# Patient Record
Sex: FEMALE | Race: BLACK OR AFRICAN AMERICAN | NOT HISPANIC OR LATINO | URBAN - METROPOLITAN AREA
[De-identification: names, ages, dates, MRNs, and addresses within clinical notes are randomized per-mention and may not be internally consistent; named-entity substitution may affect disease eponyms.]

---

## 2018-11-11 ENCOUNTER — EMERGENCY (EMERGENCY)
Facility: HOSPITAL | Age: 58
LOS: 1 days | Discharge: HOME | End: 2018-11-11
Attending: EMERGENCY MEDICINE | Admitting: EMERGENCY MEDICINE
Payer: COMMERCIAL

## 2018-11-11 VITALS
TEMPERATURE: 99 F | OXYGEN SATURATION: 100 % | DIASTOLIC BLOOD PRESSURE: 70 MMHG | RESPIRATION RATE: 16 BRPM | SYSTOLIC BLOOD PRESSURE: 144 MMHG | HEART RATE: 72 BPM

## 2018-11-11 LAB
ALBUMIN SERPL ELPH-MCNC: 4.3 G/DL — SIGNIFICANT CHANGE UP (ref 3.3–5)
ALP SERPL-CCNC: 139 U/L — HIGH (ref 40–120)
ALT FLD-CCNC: 7 U/L — SIGNIFICANT CHANGE UP (ref 4–33)
APTT BLD: 30.9 SEC — SIGNIFICANT CHANGE UP (ref 27.5–36.3)
AST SERPL-CCNC: 11 U/L — SIGNIFICANT CHANGE UP (ref 4–32)
B-OH-BUTYR SERPL-SCNC: 0.1 MMOL/L — SIGNIFICANT CHANGE UP (ref 0–0.4)
BASE EXCESS BLDV CALC-SCNC: 5.4 MMOL/L — SIGNIFICANT CHANGE UP
BILIRUB SERPL-MCNC: 0.4 MG/DL — SIGNIFICANT CHANGE UP (ref 0.2–1.2)
BLOOD GAS VENOUS - CREATININE: 0.71 MG/DL — SIGNIFICANT CHANGE UP (ref 0.5–1.3)
BUN SERPL-MCNC: 25 MG/DL — HIGH (ref 7–23)
CALCIUM SERPL-MCNC: 9.3 MG/DL — SIGNIFICANT CHANGE UP (ref 8.4–10.5)
CHLORIDE BLDV-SCNC: 100 MMOL/L — SIGNIFICANT CHANGE UP (ref 96–108)
CHLORIDE SERPL-SCNC: 97 MMOL/L — LOW (ref 98–107)
CO2 SERPL-SCNC: 25 MMOL/L — SIGNIFICANT CHANGE UP (ref 22–31)
CREAT SERPL-MCNC: 0.78 MG/DL — SIGNIFICANT CHANGE UP (ref 0.5–1.3)
GAS PNL BLDV: 136 MMOL/L — SIGNIFICANT CHANGE UP (ref 136–146)
GLUCOSE BLDV-MCNC: 356 — HIGH (ref 70–99)
GLUCOSE SERPL-MCNC: 372 MG/DL — HIGH (ref 70–99)
HBA1C BLD-MCNC: 10.5 % — HIGH (ref 4–5.6)
HCO3 BLDV-SCNC: 27 MMOL/L — SIGNIFICANT CHANGE UP (ref 20–27)
HCT VFR BLD CALC: 39.9 % — SIGNIFICANT CHANGE UP (ref 34.5–45)
HCT VFR BLDV CALC: 40.4 % — SIGNIFICANT CHANGE UP (ref 34.5–45)
HGB BLD-MCNC: 12.6 G/DL — SIGNIFICANT CHANGE UP (ref 11.5–15.5)
HGB BLDV-MCNC: 13.1 G/DL — SIGNIFICANT CHANGE UP (ref 11.5–15.5)
INR BLD: 0.99 — SIGNIFICANT CHANGE UP (ref 0.88–1.17)
LACTATE BLDV-MCNC: 1.7 MMOL/L — SIGNIFICANT CHANGE UP (ref 0.5–2)
MCHC RBC-ENTMCNC: 27.8 PG — SIGNIFICANT CHANGE UP (ref 27–34)
MCHC RBC-ENTMCNC: 31.6 % — LOW (ref 32–36)
MCV RBC AUTO: 88.1 FL — SIGNIFICANT CHANGE UP (ref 80–100)
NRBC # FLD: 0 — SIGNIFICANT CHANGE UP
PCO2 BLDV: 55 MMHG — HIGH (ref 41–51)
PH BLDV: 7.37 PH — SIGNIFICANT CHANGE UP (ref 7.32–7.43)
PLATELET # BLD AUTO: 404 K/UL — HIGH (ref 150–400)
PMV BLD: 10.4 FL — SIGNIFICANT CHANGE UP (ref 7–13)
PO2 BLDV: 26 MMHG — LOW (ref 35–40)
POTASSIUM BLDV-SCNC: 3.6 MMOL/L — SIGNIFICANT CHANGE UP (ref 3.4–4.5)
POTASSIUM SERPL-MCNC: 3.7 MMOL/L — SIGNIFICANT CHANGE UP (ref 3.5–5.3)
POTASSIUM SERPL-SCNC: 3.7 MMOL/L — SIGNIFICANT CHANGE UP (ref 3.5–5.3)
PROT SERPL-MCNC: 8 G/DL — SIGNIFICANT CHANGE UP (ref 6–8.3)
PROTHROM AB SERPL-ACNC: 11 SEC — SIGNIFICANT CHANGE UP (ref 9.8–13.1)
RBC # BLD: 4.53 M/UL — SIGNIFICANT CHANGE UP (ref 3.8–5.2)
RBC # FLD: 12 % — SIGNIFICANT CHANGE UP (ref 10.3–14.5)
SAO2 % BLDV: 38.7 % — LOW (ref 60–85)
SODIUM SERPL-SCNC: 135 MMOL/L — SIGNIFICANT CHANGE UP (ref 135–145)
WBC # BLD: 6.77 K/UL — SIGNIFICANT CHANGE UP (ref 3.8–10.5)
WBC # FLD AUTO: 6.77 K/UL — SIGNIFICANT CHANGE UP (ref 3.8–10.5)

## 2018-11-11 PROCEDURE — 99218: CPT

## 2018-11-11 PROCEDURE — 70450 CT HEAD/BRAIN W/O DYE: CPT | Mod: 26

## 2018-11-11 RX ORDER — SODIUM CHLORIDE 9 MG/ML
2000 INJECTION INTRAMUSCULAR; INTRAVENOUS; SUBCUTANEOUS ONCE
Qty: 0 | Refills: 0 | Status: COMPLETED | OUTPATIENT
Start: 2018-11-11 | End: 2018-11-11

## 2018-11-11 RX ORDER — METFORMIN HYDROCHLORIDE 850 MG/1
1000 TABLET ORAL ONCE
Qty: 0 | Refills: 0 | Status: COMPLETED | OUTPATIENT
Start: 2018-11-11 | End: 2018-11-11

## 2018-11-11 RX ORDER — MECLIZINE HCL 12.5 MG
25 TABLET ORAL ONCE
Qty: 0 | Refills: 0 | Status: COMPLETED | OUTPATIENT
Start: 2018-11-11 | End: 2018-11-11

## 2018-11-11 RX ADMIN — METFORMIN HYDROCHLORIDE 1000 MILLIGRAM(S): 850 TABLET ORAL at 23:39

## 2018-11-11 RX ADMIN — Medication 25 MILLIGRAM(S): at 18:48

## 2018-11-11 RX ADMIN — SODIUM CHLORIDE 1000 MILLILITER(S): 9 INJECTION INTRAMUSCULAR; INTRAVENOUS; SUBCUTANEOUS at 18:48

## 2018-11-11 NOTE — CONSULT NOTE ADULT - ASSESSMENT
Patient is a 58F with a history of HTN, DM and history of previous ischemic stroke 1 year ago without residual deficits who presents to the ED due to dizziness. Her dizziness first began about two days ago and has been persistent since then. She also reports associated nausea, vomiting, and diarrhea that began around the same time. She reports that her dizziness is made worse when she stands to walk and she feels very unsteady on her feet but has not had any falls. She is supposed to be on Plavix since her last stroke (was taken of aspirin due to GI ulcer) but is noncompliant with her medication. Denies smoking history. She denies headache, paresthesias, focal weakness, changes in vision, speech difficulties, or any other acute concerns.     On exam, patient has subtle horizontal nystagmus in both directions. She says her dizziness has improved while in the ED. Gait testing is normal.     Patient's dizziness could be related to dehydration from volume depletion, but patient has multiple vascular risk factors and history of prior stroke and is non-compliant with her anti-platelet medications and other medications (anti-hypertensives ,etc), putting her at high risk of recurrent stroke, so she would benefit from further evaluation and MR imaging.     Recommendations:  Plavix 75 mg daily (cannot take aspirin)  MRI brain without contrast  MRA head without contrast  MRA neck with contrast  Telemetry  TTE with bubble study

## 2018-11-11 NOTE — ED PROVIDER NOTE - PHYSICAL EXAMINATION
exam: no nystagmus. nl finger to nose and heel to shin. gait: slightly unsteady. unable to walk heel to toe

## 2018-11-11 NOTE — ED CDU PROVIDER INITIAL DAY NOTE - OBJECTIVE STATEMENT
Patient is a 58F with a history of HTN, DM and history of previous ischemic stroke 1 year ago without residual deficits who presents to the ED due to dizziness. Her dizziness first began about two days ago and has been persistent since then. She also reports associated nausea, vomiting, and diarrhea that began around the same time. She reports that her dizziness is made worse when she stands to walk and she feels very unsteady on her feet but has not had any falls. She is supposed to be on Plavix since her last stroke (was taken of aspirin due to GI ulcer) but is noncompliant with her medication. Denies smoking history. She denies headache, paresthesias, focal weakness, changes in vision, speech difficulties, or any other acute concerns. 58F with a history of HTN, DM and history of previous ischemic stroke 1 year ago without residual deficits who presents to the ED due to dizziness. Her dizziness first began about two days ago and has been persistent since then. She also reports associated nausea, vomiting, and diarrhea that began around the same time. She reports that her dizziness is made worse when she stands to walk and she feels very unsteady on her feet but has not had any falls. She is supposed to be on Plavix since her last stroke (was taken of aspirin due to GI ulcer) but is noncompliant with her medication. Denies smoking history. She denies headache, paresthesias, focal weakness, changes in vision, speech difficulties, or any other acute concerns. Pt admitted to the CDU for MRI

## 2018-11-11 NOTE — ED PROVIDER NOTE - OBJECTIVE STATEMENT
snehal: pmh includes dm, htn, cva (? 1 year ago).  2 days ago developed sx of vertigo, imbalance, frequent vomiting, diarrhea.

## 2018-11-11 NOTE — ED PROVIDER NOTE - PROGRESS NOTE DETAILS
snehal: seen by neurology. prelim neurology read CT negative for acute process.   MRs requested. Pts sx have decreased after meclizine.

## 2018-11-11 NOTE — ED ADULT NURSE NOTE - NSIMPLEMENTINTERV_GEN_ALL_ED
Implemented All Universal Safety Interventions:  [place] to call system. Call bell, personal items and telephone within reach. Instruct patient to call for assistance. Room bathroom lighting operational. Non-slip footwear when patient is off stretcher. Physically safe environment: no spills, clutter or unnecessary equipment. Stretcher in lowest position, wheels locked, appropriate side rails in place.

## 2018-11-11 NOTE — CONSULT NOTE ADULT - ATTENDING COMMENTS
Ms. Sanchez is a 58F with a history of HTN, DM and history of previous ischemic stroke 1 year ago without residual deficits who presents to the ED due to dizziness. Her dizziness first began about two days ago and has been persistent since then. she has been noncompliant with antiplatelet regimen of taking Plavix on a daily basis.  Her neurological exam is nonfocal. MRI brain reveals a small punctate hyperintensity in the jenniffer suggestive of possible acute/subacute small infarction likely secondary to small vessel disease and numerous white matter changes;  lacunar infarcts in bilateral cerebral hemispheres.  I recommended aggressive risk factor control; compliance with Plavix and follow up at the clinic.  Aggressive risk factor modification should be continued for secondary stroke prevention, consisting of antithrombotic therapy  intensive blood pressure control and lipid lowering therapy.I encouraged the patient to discuss these important issues with her primary care doctor.I have reviewed the goals of stroke risk factor modification. I counseled the patient on measures to reduce stroke risk, including the importance of medication compliance, risk factor control, exercise, healthy diet and avoidance of smoking. I reviewed stroke warning signs and symptoms and appropriate actions to take if such occur.

## 2018-11-11 NOTE — CONSULT NOTE ADULT - SUBJECTIVE AND OBJECTIVE BOX
HPI:    Patient is a 58F with a history of HTN, DM and history of previous ischemic stroke 1 year ago without residual deficits who presents to the ED due to dizziness. Her dizziness first began about two days ago and has been persistent since then. She also reports associated nausea, vomiting, and diarrhea that began around the same time. She reports that her dizziness is made worse when she stands to walk and she feels very unsteady on her feet but has not had any falls. She is supposed to be on Plavix since her last stroke (was taken of aspirin due to GI ulcer) but is noncompliant with her medication. Denies smoking history. She denies headache, paresthesias, focal weakness, changes in vision, speech difficulties, or any other acute concerns.       PAST MEDICAL & SURGICAL HISTORY:  Diabetes  Hypertension    FAMILY HISTORY:    Allergies    No Known Allergies    SHx - No smoking history       Review of Systems:  CONSTITUTIONAL:   HEENT:  No visual loss, blurred vision, double vision.  No hearing loss, sneezing, congestion, runny nose or sore throat.  SKIN:  No rash or itching.  CARDIOVASCULAR:  No chest pain, chest pressure or chest discomfort. No palpitations or edema.  RESPIRATORY:  No shortness of breath, cough or sputum.  GASTROINTESTINAL:  +nausea, vomiting, diarrhea  GENITOURINARY:  No dysuria. No increased frequency. No retention. No incontinence.  NEUROLOGICAL:  See HPI  MUSCULOSKELETAL:  No muscle, back pain, joint pain or stiffness.  HEMATOLOGIC:  No anemia, bleeding or bruising.  ENDOCRINOLOGIC:  No reports of sweating, cold or heat intolerance. No polyuria or polydipsia.      Vital Signs Last 24 Hrs  T(C): 37 (11 Nov 2018 17:12), Max: 37 (11 Nov 2018 17:12)  T(F): 98.6 (11 Nov 2018 17:12), Max: 98.6 (11 Nov 2018 17:12)  HR: 64 (11 Nov 2018 20:41) (64 - 75)  BP: 150/76 (11 Nov 2018 20:41) (144/70 - 150/76)  BP(mean): --  RR: 18 (11 Nov 2018 20:41) (16 - 18)  SpO2: 99% (11 Nov 2018 20:41) (99% - 100%)    General Exam:   General appearance: No acute distress   Neurological Exam:  Mental Status: Orientated to self, date and place.  Attention intact.  No dysarthria. Speech fluent.  Cranial Nerves:   PERRL, EOMI, VFF. Subtle horizontal nystagmus bidirectionally.  CN V1-3 intact to light touch .  No facial asymmetry.  Hearing intact to finger rub bilaterally.  Tongue, uvula and palate midline.  Sternocleidomastoid and Trapezius intact bilaterally.    Motor:   Tone: normal.                  Strength:     [] Upper extremity                      Delt       Bicep    Tricep                                                  R         5/5 5/5 5/5 5/5                                               L          5/5 5/5 5/5 5/5  [] Lower extremity                       HF          KE          KF        DF         PF                                               R        5/5 5/5 5/5 5/5 5/5                                               L         5/5 5/5 5/5 5/5 5/5  Pronator drift: none                 Dysmetria: None to finger-nose-finger     Tremor: No resting tremors  Gait: normal.        11-11    135  |  97<L>  |  25<H>  ----------------------------<  372<H>  3.7   |  25  |  0.78    Ca    9.3      11 Nov 2018 17:45    TPro  8.0  /  Alb  4.3  /  TBili  0.4  /  DBili  x   /  AST  11  /  ALT  7   /  AlkPhos  139<H>  11-11                            12.6   6.77  )-----------( 404      ( 11 Nov 2018 17:45 )             39.9

## 2018-11-11 NOTE — ED CDU PROVIDER INITIAL DAY NOTE - ATTENDING CONTRIBUTION TO CARE
snehal: poor balance, vertigo, vomiting, diarhea 2 days.   exam: walks with hesitation, unable to walk heel to toe.   no nystagmus.   impression: Vomiting appears to be related to vertigo which may be central (pmh) or peripheral. Sx do not appear to be due to dehydration, although pt also has diarrhea.  (pt supposed be taking plavix but is not compliant).   CT head performed. neurology consulted and MRs ordered. receiving fluids and meclizine with improved sx.    need to determine if the sx are central, peripheral, or other.   Pt also needs to have her plavix/asa medication regime reviewed, as she is not compliant.

## 2018-11-11 NOTE — ED PROVIDER NOTE - ATTENDING CONTRIBUTION TO CARE
snehal: pmh includes dm, htn, cva (? 1 year ago).  2 days ago developed sx of vertigo, imbalance, frequent vomiting, diarrhea.   exam: no nystagmus. nl finger to nose and heel to shin. gait: slightly unsteady. unable to walk heel to toe.  impression: Vomiting appears to be related to vertigo which may be central (pmh) or peripheral. Sx do not appear to be due to dehydration, although pt also has diarrhea.  (pt supposed be taking plavix but is not compliant).   CT head requested. neurology consulting. to receive fluids and meclizine.   labs pending. ; .  impression: Vomiting appears to be related to vertigo which may be central (pmh) or peripheral. Sx do not appear to be due to dehydration, although pt also has diarrhea.  (pt supposed be taking plavix but is not compliant).   CT head requested. neurology consulting. to receive fluids and meclizine.   labs pending. ;

## 2018-11-11 NOTE — ED ADULT NURSE NOTE - ED STAT RN HANDOFF DETAILS
Rpt given to CDU CAROLINE Hurst pt vitally stable, in no acute distress, coming to obs for MRI and further neuro eval

## 2018-11-11 NOTE — ED ADULT NURSE REASSESSMENT NOTE - NS ED NURSE REASSESS COMMENT FT1
Pt vitally stable, ambulating w/ steady gait, states dizziness improved, in no acute distress.  Tolerating PO at this time.  CT Rd pending, plans for CDU obs for MRI overnight.  Pt acceptable to plan of care, will CTM.

## 2018-11-11 NOTE — ED PROVIDER NOTE - MEDICAL DECISION MAKING DETAILS
impression: Vomiting appears to be related to vertigo which may be central (pmh) or peripheral. Sx do not appear to be due to dehydration, although pt also has diarrhea.  (pt supposed be taking plavix but is not compliant).   CT head requested. neurology consulting. to receive fluids and meclizine.   labs pending.

## 2018-11-11 NOTE — ED ADULT NURSE NOTE - OBJECTIVE STATEMENT
p/t is a 58y old female with hx DM received awake and responsive, c/o of diffuse abd discomfort and vomiting for few days, cardiac monitor is on vss, bloods drawn and sent to lab no further c/o noted will continue to monitor

## 2018-11-12 VITALS
SYSTOLIC BLOOD PRESSURE: 151 MMHG | TEMPERATURE: 97 F | HEART RATE: 59 BPM | OXYGEN SATURATION: 100 % | RESPIRATION RATE: 16 BRPM | DIASTOLIC BLOOD PRESSURE: 75 MMHG

## 2018-11-12 PROCEDURE — 70548 MR ANGIOGRAPHY NECK W/DYE: CPT | Mod: 26

## 2018-11-12 PROCEDURE — 99285 EMERGENCY DEPT VISIT HI MDM: CPT

## 2018-11-12 PROCEDURE — 70551 MRI BRAIN STEM W/O DYE: CPT | Mod: 26

## 2018-11-12 PROCEDURE — 99217: CPT

## 2018-11-12 RX ORDER — CLOPIDOGREL BISULFATE 75 MG/1
1 TABLET, FILM COATED ORAL
Qty: 21 | Refills: 0 | OUTPATIENT
Start: 2018-11-12 | End: 2018-12-02

## 2018-11-12 RX ORDER — ASPIRIN/CALCIUM CARB/MAGNESIUM 324 MG
1 TABLET ORAL
Qty: 21 | Refills: 0 | OUTPATIENT
Start: 2018-11-12 | End: 2018-12-02

## 2018-11-12 NOTE — ED CDU PROVIDER SUBSEQUENT DAY NOTE - MEDICAL DECISION MAKING DETAILS
history of HTN, DM and history of previous ischemic stroke 1 year ago without residual deficits who presents to the ED due to dizziness. Her dizziness first began about two days ago and has been persistent since then. She also reports associated nausea, vomiting, and diarrhea that began around the same time. She reports that her dizziness is made worse when she stands to walk and she feels very unsteady on her feet but has not had any falls. She is supposed to be on Plavix since her last stroke (was taken of aspirin due to GI ulcer) but is noncompliant with her medication. Denies smoking history. She denies headache, paresthesias, focal weakness, changes in vision, speech difficulties, or any other acute concerns.  MRI shows possible new ischemia on mri, small location. neuro clear for asa, plavix to d/c home with . stable for f/u outpt neuro.

## 2018-11-12 NOTE — ED CDU PROVIDER SUBSEQUENT DAY NOTE - ATTENDING CONTRIBUTION TO CARE
I performed a face to face bedside interview with patient regarding history of present illness, review of symptoms and past medical history. I completed an independent physical exam.  I have discussed patient's plan of care.   I agree with note as stated above, having amended the EMR as needed to reflect my findings. I have discussed the assessment and plan of care.  This includes during the time I functioned as the attending physician for this patient.  Attending Contribution to Care: agree with plan of PA. history of HTN, DM and history of previous ischemic stroke 1 year ago without residual deficits who presents to the ED due to dizziness. Her dizziness first began about two days ago and has been persistent since then. She also reports associated nausea, vomiting, and diarrhea that began around the same time. She reports that her dizziness is made worse when she stands to walk and she feels very unsteady on her feet but has not had any falls. She is supposed to be on Plavix since her last stroke (was taken of aspirin due to GI ulcer) but is noncompliant with her medication. Denies smoking history. She denies headache, paresthesias, focal weakness, changes in vision, speech difficulties, or any other acute concerns.  MRI shows possible new ischemia on mri, small location. neuro clear for asa, plavix to d/c home with . stable for f/u outpt neuro.

## 2018-11-12 NOTE — ED CDU PROVIDER SUBSEQUENT DAY NOTE - HISTORY
58 y.o female pmhx of HTN, DM and  previous ischemic stroke 1 year ago without residual deficits who presented to the ED due to dizziness. Her dizziness first began about two days ago and has been persistent since then. Sent to CDU for MRI , symptomatic treatment and neuro. pt has been asymptomatic , ambulating around ED. Seen by neurology with attending Dr. lizzette fuller who states patient can be discharged home to follow with neurology vascular and baby aspirin and plavix. Pt ready for dc. denies headache, dizziness, chest pain, sob, palpitations.

## 2018-11-12 NOTE — ED CDU PROVIDER DISPOSITION NOTE - CLINICAL COURSE
history of HTN, DM and history of previous ischemic stroke 1 year ago without residual deficits who presents to the ED due to dizziness. Her dizziness first began about two days ago and has been persistent since then. She also reports associated nausea, vomiting, and diarrhea that began around the same time. She reports that her dizziness is made worse when she stands to walk and she feels very unsteady on her feet but has not had any falls. She is supposed to be on Plavix since her last stroke (was taken of aspirin due to GI ulcer) but is noncompliant with her medication. Denies smoking history. She denies headache, paresthesias, focal weakness, changes in vision, speech difficulties, or any other acute concerns.  MRI shows possible new ischemia on mri, small location. pt currently asymptomatic with no dizziness. neuro clear for asa, plavix to d/c home with . stable for f/u outpt neuro.

## 2018-11-12 NOTE — ED CDU PROVIDER DISPOSITION NOTE - ATTENDING CONTRIBUTION TO CARE
I performed a face to face bedside interview with patient regarding history of present illness, review of symptoms and past medical history. I completed an independent physical exam.  I have discussed patient's plan of care.   I agree with note as stated above, having amended the EMR as needed to reflect my findings. I have discussed the assessment and plan of care.  This includes during the time I functioned as the attending physician for this patient.  Attending Contribution to Care: agree with plan of pa. history of HTN, DM and history of previous ischemic stroke 1 year ago without residual deficits who presents to the ED due to dizziness. Her dizziness first began about two days ago and has been persistent since then. She also reports associated nausea, vomiting, and diarrhea that began around the same time. She reports that her dizziness is made worse when she stands to walk and she feels very unsteady on her feet but has not had any falls. She is supposed to be on Plavix since her last stroke (was taken of aspirin due to GI ulcer) but is noncompliant with her medication. Denies smoking history. She denies headache, paresthesias, focal weakness, changes in vision, speech difficulties, or any other acute concerns.  MRI shows possible new ischemia on mri, small location. pt currently asymptomatic with no dizziness. neuro clear for asa, plavix to d/c home with . stable for f/u outpt neuro.

## 2018-11-12 NOTE — ED CDU PROVIDER DISPOSITION NOTE - NSFOLLOWUPINSTRUCTIONS_ED_ALL_ED_FT
Rest, advance activity as tolerated. Take aspirin 81 mg once a day. Take Plavix 75 mg once a day for 3 weeks. Follow up with your primary care physician in 48-72 hours- bring copies of your results. Follow up with Neurovascular specialist- referral list provided.  Return to the Emergency Department for worsening or persistent symptoms OR ANY NEW OR CONCERNING SYMPTOMS.

## 2018-11-12 NOTE — ED CDU PROVIDER SUBSEQUENT DAY NOTE - PROGRESS NOTE DETAILS
Patient is resting comfortably in NAD, VSS, Pt is aware they are pending imaging studies and will be observed in CDU until further notice. Will continue to monitor and document any acute interval changes.

## 2018-11-14 PROBLEM — Z00.00 ENCOUNTER FOR PREVENTIVE HEALTH EXAMINATION: Status: ACTIVE | Noted: 2018-11-14

## 2018-12-07 ENCOUNTER — APPOINTMENT (OUTPATIENT)
Dept: NEUROLOGY | Facility: CLINIC | Age: 58
End: 2018-12-07
Payer: COMMERCIAL

## 2018-12-07 PROCEDURE — 93886 INTRACRANIAL COMPLETE STUDY: CPT

## 2018-12-07 PROCEDURE — 93892 TCD EMBOLI DETECT W/O INJ: CPT

## 2018-12-07 PROCEDURE — 93880 EXTRACRANIAL BILAT STUDY: CPT

## 2018-12-12 ENCOUNTER — APPOINTMENT (OUTPATIENT)
Dept: NEUROLOGY | Facility: CLINIC | Age: 58
End: 2018-12-12
Payer: COMMERCIAL

## 2018-12-12 VITALS
BODY MASS INDEX: 27.38 KG/M2 | WEIGHT: 145 LBS | HEART RATE: 54 BPM | HEIGHT: 61 IN | SYSTOLIC BLOOD PRESSURE: 146 MMHG | DIASTOLIC BLOOD PRESSURE: 79 MMHG

## 2018-12-12 DIAGNOSIS — Z86.73 PERSONAL HISTORY OF TRANSIENT ISCHEMIC ATTACK (TIA), AND CEREBRAL INFARCTION W/OUT RESIDUAL DEFICITS: ICD-10-CM

## 2018-12-12 DIAGNOSIS — Z82.49 FAMILY HISTORY OF ISCHEMIC HEART DISEASE AND OTHER DISEASES OF THE CIRCULATORY SYSTEM: ICD-10-CM

## 2018-12-12 DIAGNOSIS — I10 ESSENTIAL (PRIMARY) HYPERTENSION: ICD-10-CM

## 2018-12-12 DIAGNOSIS — Z86.39 PERSONAL HISTORY OF OTHER ENDOCRINE, NUTRITIONAL AND METABOLIC DISEASE: ICD-10-CM

## 2018-12-12 DIAGNOSIS — Z83.3 FAMILY HISTORY OF DIABETES MELLITUS: ICD-10-CM

## 2018-12-12 DIAGNOSIS — Z82.3 FAMILY HISTORY OF STROKE: ICD-10-CM

## 2018-12-12 DIAGNOSIS — Z78.9 OTHER SPECIFIED HEALTH STATUS: ICD-10-CM

## 2018-12-12 PROCEDURE — 99215 OFFICE O/P EST HI 40 MIN: CPT

## 2018-12-12 RX ORDER — SIMVASTATIN 20 MG/1
20 TABLET, FILM COATED ORAL
Refills: 0 | Status: DISCONTINUED | COMMUNITY
End: 2018-12-12

## 2018-12-12 RX ORDER — ATORVASTATIN CALCIUM 80 MG/1
80 TABLET, FILM COATED ORAL
Qty: 90 | Refills: 3 | Status: ACTIVE | COMMUNITY
Start: 2018-12-12 | End: 1900-01-01

## 2018-12-12 RX ORDER — FAMOTIDINE 40 MG/1
40 TABLET, FILM COATED ORAL
Qty: 30 | Refills: 0 | Status: ACTIVE | COMMUNITY
Start: 2018-12-12 | End: 1900-01-01

## 2018-12-12 RX ORDER — CLOPIDOGREL 75 MG/1
75 TABLET, FILM COATED ORAL
Refills: 0 | Status: ACTIVE | COMMUNITY

## 2018-12-12 RX ORDER — LISINOPRIL 20 MG/1
20 TABLET ORAL
Refills: 0 | Status: ACTIVE | COMMUNITY

## 2018-12-12 RX ORDER — ATENOLOL 25 MG/1
25 TABLET ORAL
Refills: 0 | Status: ACTIVE | COMMUNITY

## 2018-12-17 ENCOUNTER — OTHER (OUTPATIENT)
Age: 58
End: 2018-12-17

## 2019-02-11 ENCOUNTER — FORM ENCOUNTER (OUTPATIENT)
Age: 59
End: 2019-02-11

## 2019-02-12 ENCOUNTER — APPOINTMENT (OUTPATIENT)
Dept: MRI IMAGING | Facility: IMAGING CENTER | Age: 59
End: 2019-02-12
Payer: COMMERCIAL

## 2019-02-12 ENCOUNTER — OUTPATIENT (OUTPATIENT)
Dept: OUTPATIENT SERVICES | Facility: HOSPITAL | Age: 59
LOS: 1 days | End: 2019-02-12
Payer: COMMERCIAL

## 2019-02-12 DIAGNOSIS — I63.341 CEREBRAL INFARCTION DUE TO THROMBOSIS OF RIGHT CEREBELLAR ARTERY: ICD-10-CM

## 2019-02-12 DIAGNOSIS — I67.2 CEREBRAL ATHEROSCLEROSIS: ICD-10-CM

## 2019-02-12 PROCEDURE — 70546 MR ANGIOGRAPH HEAD W/O&W/DYE: CPT | Mod: 26

## 2019-02-12 PROCEDURE — 70549 MR ANGIOGRAPH NECK W/O&W/DYE: CPT

## 2019-02-12 PROCEDURE — 70546 MR ANGIOGRAPH HEAD W/O&W/DYE: CPT

## 2019-02-12 PROCEDURE — A9585: CPT

## 2019-02-12 PROCEDURE — 70549 MR ANGIOGRAPH NECK W/O&W/DYE: CPT | Mod: 26

## 2019-02-12 PROCEDURE — 70551 MRI BRAIN STEM W/O DYE: CPT

## 2019-02-13 LAB
BUN SERPL-MCNC: 19 MG/DL
CHOLEST SERPL-MCNC: 207 MG/DL
CHOLEST/HDLC SERPL: 3.1 RATIO
CREAT SERPL-MCNC: 0.82 MG/DL
HBA1C MFR BLD HPLC: 9.2 %
HDLC SERPL-MCNC: 67 MG/DL
LDLC SERPL CALC-MCNC: 122 MG/DL
PA ADP PRP-ACNC: 47 PRU
TRIGL SERPL-MCNC: 88 MG/DL

## 2019-02-26 ENCOUNTER — APPOINTMENT (OUTPATIENT)
Dept: NEUROLOGY | Facility: CLINIC | Age: 59
End: 2019-02-26
Payer: COMMERCIAL

## 2019-02-26 VITALS
BODY MASS INDEX: 27.38 KG/M2 | HEART RATE: 63 BPM | SYSTOLIC BLOOD PRESSURE: 131 MMHG | HEIGHT: 61 IN | DIASTOLIC BLOOD PRESSURE: 76 MMHG | WEIGHT: 145 LBS

## 2019-02-26 PROCEDURE — 99215 OFFICE O/P EST HI 40 MIN: CPT

## 2019-02-26 RX ORDER — MECLIZINE HYDROCHLORIDE 25 MG/1
25 TABLET ORAL
Refills: 0 | Status: DISCONTINUED | COMMUNITY
End: 2019-02-26

## 2019-02-26 RX ORDER — ASPIRIN 81 MG/1
81 TABLET ORAL DAILY
Qty: 90 | Refills: 0 | Status: DISCONTINUED | COMMUNITY
Start: 2018-12-12 | End: 2019-02-26

## 2019-02-26 NOTE — REVIEW OF SYSTEMS
[As Noted in HPI] : as noted in HPI [Negative] : Heme/Lymph [FreeTextEntry7] : Abnormal taste in the mouth

## 2019-02-26 NOTE — ASSESSMENT
[FreeTextEntry1] : 59 Y/O R handed woman with vascular risk factors of age, HTN, DM II, HPLD and stroke in 2017 without any residual deficits is seen in the vascular neurology office for the evaluation and management of stroke, leading to ED visit in 11/18. On 11/8/18, she noticed "floating sensations" associated with nausea, vomiting and imbalance. She did not seek immediate medical attention but presented to [facility] on 11/11 due to persistence of the symptoms and a fall. MRI brain (11/12/18) showed a small punctate acute infarct in the right AICA distribution and old right MCA distribution lacunar infarct with Moderate leukoaraiosis. MRA head and neck (11/12/18) showed diffuse intracranial atherosclerosis leading to severe/critical stenosis of bilateral distal vertebral arteries/vertebrobasilar junction and moderate stenosis of the left supraclinoid ICA. MRI brain (2/12/19) did not show any evidence of acute infarct or hemorrhage but showed moderate leukoaraiosis and old right periventricular lacunar infarct. MRA head and neck (2/12/19) showed diffuse intracranial atherosclerosis including moderately severe stenosis of vertebrobasilar junction (moderate stenosis of right intracranial vertebral artery, severe to critical stenosis of left intervertebral artery, moderate stenosis of proximal basilar artery), mild to moderate stenosis of bilateral ACAs/A2 segments and bilateral MCAs/M2 segments (left worse than right) without any significant extracranial cerebral large vessel severe stenosis or occlusion. \par \par Impression:\par Cerebral thrombosis/embolism but cerebral infarction. A small punctate right AICA distribution stroke - likely etiology being cryptogenic stroke with competing mechanisms being large vessel disease i.e. symptomatic intracranial atherosclerosis (vertebrobasilar junction) leading to artery to artery embolism versus small vessel disease; less likely to be due to embolism from a proximal source like cardiac/paradoxical source of embolism\par \par Plan:\par - She was treated with dual antiplatelet therapy in the form of aspirin and clopidogrel for 3 months for secondary stroke prevention in the setting of symptomatic intracranial atherosclerosis. She should continue with clopidogrel 75 mg once a day for secondary stroke prevention, indefinitely if not contraindicated due to any specific reasons in the future. P2Y12 testing shows optimal platelet inhibition with clopidogrel. Advised to avoid medications like PPIs/omeprazole due to drug-drug interaction with clopidogrel\par - Atorvastatin 80 mg and bedtime considering likely atheroembolic etiology of her stroke and age\par - She should follow up closely with her primary care physician for optimal vascular risk factors modifications including blood pressure goal less than 130/80 mmHg, HbA1c goal <7 and preferably 6-6.5 and optimal cholesterol management \par - She is advised to check blood pressure regularly at home, preferably at different times during the day and multiple days a week and was advised to keep a log of BPs to bring to primary care physician visit for further instructions regarding optimal BP management. Also advised to followup closely with PCP regarding regular blood work including monitoring of HbA1c and cholesterol profile\par - Transthoracic echocardiogram with bubble study to evaluate for possible structural cardiac source of embolism\par - Prolonged cardiac monitoring with 30 days event monitor to screen for occult cardiac arrhythmias like atrial fibrillation being the cardiac source of embolism, as it could potentially change the measures of secondary stroke prevention. \par - Referred to a cardiologist to obtain above-mentioned cardiac tests\par - Endocrinologist referral for aggressive glycemic control and further evaluation of incidentally noted thyroid nodule on CUS\par - Lower extremity duplex to rule out any evidence of DVT being paradoxical source of embolism \par - Re-referral to sleep disorder specialist to evaluate for possible obstructive sleep apnea as a potential vascular risk factor\par - CUS and TCDs to be performed annually to follow up on intracranial and extracranial cerebral vasculature\par - Advised to consult with her PCP regarding further evaluation and optimal management of thyroid nodule incidentally noted on CUS\par - Physical therapy evaluation for gait eval and fall prevention\par \par - Above mentioned plan was discussed with patient and available family members in detail. I have answered all their questions to the best of my abilities. I have reviewed measures for secondary stroke prevention with the patient and available family members, including aggressive vascular risk factors modification, healthy diet, regular exercise and medication compliance. As well as discussed the need for calling 911 immediately in case of any symptoms concerning for stroke in the future. \par - I would follow her in the office in about 4-5 months or earlier as needed. Also advised to contact me upon completion of imaging studies and/or laboratory testing/investigations to discuss the results over the phone.

## 2019-02-26 NOTE — PHYSICAL EXAM
[FreeTextEntry1] : General exam: Sitting in the chair and does not appear to be in distress\par Carotid bruits: None\par CVS: S1-S2 present\par RS: CTA B\par Skin: No lesion noted on visible skin \par \par Neuro exam: \par MS: Alert, awake and is oriented to time, place and person with normal attention span, normal recent and remote memory\par Language: Fluent speech with intact comprehension, with intact naming and repetition, no right-left confusion, no finger agnosia and no apraxia. Normal fund of knowledge. No dysarthria. \par Cr.N.: Pupils bilaterally 2-3 mm in size, equal, round and reactive to light, fundus examination was performed but I was unable to locate the discs due to technical difficulties including poor fixation and small-sized pupils, intact visual fields to confrontation, extraocular movements intact without any diplopia, no ptosis, no nystagmus, face appears to be symmetric with intact facial sensations, no hearing loss to rubbing fingers, tongue is in the midline, uvula elevates in the midline and without any drooping of the soft palate, normal shrugging of the shoulders bilaterally.\par \par Motor: \par Tone - Normal\par Bulk - No atrophy\par Power - 5/5 all over without any pronator drift\par DTRs - +2 all over and slightly brisker on the left \par Plantars: Bilaterally flexor\par Sensory: Intact to light touch and pinprick, no sensory extinction. \par Cerebellar: No ataxia on finger-nose-finger and knee-heel-shin testing, as well as without any dysdiadochokinesia\par Gait: Normal casual gait with normal stride and length and was able to perform toe, heel and tandem walking\par Romberg's sign: Negative.

## 2019-02-28 ENCOUNTER — OTHER (OUTPATIENT)
Age: 59
End: 2019-02-28

## 2019-07-02 ENCOUNTER — APPOINTMENT (OUTPATIENT)
Dept: NEUROLOGY | Facility: CLINIC | Age: 59
End: 2019-07-02

## 2019-07-09 ENCOUNTER — APPOINTMENT (OUTPATIENT)
Dept: NEUROLOGY | Facility: CLINIC | Age: 59
End: 2019-07-09

## 2019-08-30 ENCOUNTER — EMERGENCY (EMERGENCY)
Facility: HOSPITAL | Age: 59
LOS: 1 days | Discharge: HOME | End: 2019-08-30
Attending: HOSPITALIST | Admitting: HOSPITALIST
Payer: COMMERCIAL

## 2019-08-30 VITALS
OXYGEN SATURATION: 100 % | SYSTOLIC BLOOD PRESSURE: 170 MMHG | RESPIRATION RATE: 17 BRPM | HEART RATE: 75 BPM | DIASTOLIC BLOOD PRESSURE: 67 MMHG

## 2019-08-30 VITALS
DIASTOLIC BLOOD PRESSURE: 94 MMHG | OXYGEN SATURATION: 100 % | SYSTOLIC BLOOD PRESSURE: 189 MMHG | TEMPERATURE: 99 F | HEART RATE: 80 BPM | WEIGHT: 145.06 LBS | RESPIRATION RATE: 16 BRPM

## 2019-08-30 LAB
ALBUMIN SERPL ELPH-MCNC: 4.2 G/DL — SIGNIFICANT CHANGE UP (ref 3.3–5)
ALP SERPL-CCNC: 132 U/L — HIGH (ref 40–120)
ALT FLD-CCNC: 22 U/L — SIGNIFICANT CHANGE UP (ref 4–33)
ANION GAP SERPL CALC-SCNC: 12 MMO/L — SIGNIFICANT CHANGE UP (ref 7–14)
APPEARANCE UR: CLEAR — SIGNIFICANT CHANGE UP
AST SERPL-CCNC: 16 U/L — SIGNIFICANT CHANGE UP (ref 4–32)
BACTERIA # UR AUTO: NEGATIVE — SIGNIFICANT CHANGE UP
BASE EXCESS BLDV CALC-SCNC: 2.3 MMOL/L — SIGNIFICANT CHANGE UP
BASOPHILS # BLD AUTO: 0.03 K/UL — SIGNIFICANT CHANGE UP (ref 0–0.2)
BASOPHILS NFR BLD AUTO: 0.4 % — SIGNIFICANT CHANGE UP (ref 0–2)
BILIRUB SERPL-MCNC: 0.4 MG/DL — SIGNIFICANT CHANGE UP (ref 0.2–1.2)
BILIRUB UR-MCNC: NEGATIVE — SIGNIFICANT CHANGE UP
BLOOD GAS VENOUS - CREATININE: 1.05 MG/DL — SIGNIFICANT CHANGE UP (ref 0.5–1.3)
BLOOD GAS VENOUS - FIO2: 21 — SIGNIFICANT CHANGE UP
BLOOD UR QL VISUAL: NEGATIVE — SIGNIFICANT CHANGE UP
BUN SERPL-MCNC: 23 MG/DL — SIGNIFICANT CHANGE UP (ref 7–23)
CALCIUM SERPL-MCNC: 9.4 MG/DL — SIGNIFICANT CHANGE UP (ref 8.4–10.5)
CHLORIDE BLDV-SCNC: 107 MMOL/L — SIGNIFICANT CHANGE UP (ref 96–108)
CHLORIDE SERPL-SCNC: 104 MMOL/L — SIGNIFICANT CHANGE UP (ref 98–107)
CO2 SERPL-SCNC: 25 MMOL/L — SIGNIFICANT CHANGE UP (ref 22–31)
COLOR SPEC: SIGNIFICANT CHANGE UP
CREAT SERPL-MCNC: 1.01 MG/DL — SIGNIFICANT CHANGE UP (ref 0.5–1.3)
EOSINOPHIL # BLD AUTO: 0.33 K/UL — SIGNIFICANT CHANGE UP (ref 0–0.5)
EOSINOPHIL NFR BLD AUTO: 3.9 % — SIGNIFICANT CHANGE UP (ref 0–6)
GAS PNL BLDV: 141 MMOL/L — SIGNIFICANT CHANGE UP (ref 136–146)
GLUCOSE BLDV-MCNC: 153 MG/DL — HIGH (ref 70–99)
GLUCOSE SERPL-MCNC: 168 MG/DL — HIGH (ref 70–99)
GLUCOSE UR-MCNC: NEGATIVE — SIGNIFICANT CHANGE UP
HCO3 BLDV-SCNC: 25 MMOL/L — SIGNIFICANT CHANGE UP (ref 20–27)
HCT VFR BLD CALC: 31.3 % — LOW (ref 34.5–45)
HCT VFR BLDV CALC: 31.4 % — LOW (ref 34.5–45)
HGB BLD-MCNC: 9.9 G/DL — LOW (ref 11.5–15.5)
HGB BLDV-MCNC: 10.2 G/DL — LOW (ref 11.5–15.5)
HYALINE CASTS # UR AUTO: NEGATIVE — SIGNIFICANT CHANGE UP
IMM GRANULOCYTES NFR BLD AUTO: 0.2 % — SIGNIFICANT CHANGE UP (ref 0–1.5)
KETONES UR-MCNC: NEGATIVE — SIGNIFICANT CHANGE UP
LACTATE BLDV-MCNC: 0.9 MMOL/L — SIGNIFICANT CHANGE UP (ref 0.5–2)
LEUKOCYTE ESTERASE UR-ACNC: NEGATIVE — SIGNIFICANT CHANGE UP
LYMPHOCYTES # BLD AUTO: 0.86 K/UL — LOW (ref 1–3.3)
LYMPHOCYTES # BLD AUTO: 10.2 % — LOW (ref 13–44)
MCHC RBC-ENTMCNC: 28.5 PG — SIGNIFICANT CHANGE UP (ref 27–34)
MCHC RBC-ENTMCNC: 31.6 % — LOW (ref 32–36)
MCV RBC AUTO: 90.2 FL — SIGNIFICANT CHANGE UP (ref 80–100)
MONOCYTES # BLD AUTO: 0.66 K/UL — SIGNIFICANT CHANGE UP (ref 0–0.9)
MONOCYTES NFR BLD AUTO: 7.9 % — SIGNIFICANT CHANGE UP (ref 2–14)
NEUTROPHILS # BLD AUTO: 6.5 K/UL — SIGNIFICANT CHANGE UP (ref 1.8–7.4)
NEUTROPHILS NFR BLD AUTO: 77.4 % — HIGH (ref 43–77)
NITRITE UR-MCNC: NEGATIVE — SIGNIFICANT CHANGE UP
NRBC # FLD: 0 K/UL — SIGNIFICANT CHANGE UP (ref 0–0)
PCO2 BLDV: 44 MMHG — SIGNIFICANT CHANGE UP (ref 41–51)
PH BLDV: 7.4 PH — SIGNIFICANT CHANGE UP (ref 7.32–7.43)
PH UR: 8 — SIGNIFICANT CHANGE UP (ref 5–8)
PLATELET # BLD AUTO: 304 K/UL — SIGNIFICANT CHANGE UP (ref 150–400)
PMV BLD: 10.4 FL — SIGNIFICANT CHANGE UP (ref 7–13)
PO2 BLDV: 25 MMHG — LOW (ref 35–40)
POTASSIUM BLDV-SCNC: 3.7 MMOL/L — SIGNIFICANT CHANGE UP (ref 3.4–4.5)
POTASSIUM SERPL-MCNC: 4 MMOL/L — SIGNIFICANT CHANGE UP (ref 3.5–5.3)
POTASSIUM SERPL-SCNC: 4 MMOL/L — SIGNIFICANT CHANGE UP (ref 3.5–5.3)
PROT SERPL-MCNC: 7.5 G/DL — SIGNIFICANT CHANGE UP (ref 6–8.3)
PROT UR-MCNC: 20 — SIGNIFICANT CHANGE UP
RBC # BLD: 3.47 M/UL — LOW (ref 3.8–5.2)
RBC # FLD: 12.4 % — SIGNIFICANT CHANGE UP (ref 10.3–14.5)
RBC CASTS # UR COMP ASSIST: SIGNIFICANT CHANGE UP (ref 0–?)
SAO2 % BLDV: 39.5 % — LOW (ref 60–85)
SODIUM SERPL-SCNC: 141 MMOL/L — SIGNIFICANT CHANGE UP (ref 135–145)
SP GR SPEC: 1.02 — SIGNIFICANT CHANGE UP (ref 1–1.04)
SQUAMOUS # UR AUTO: SIGNIFICANT CHANGE UP
UROBILINOGEN FLD QL: NORMAL — SIGNIFICANT CHANGE UP
WBC # BLD: 8.4 K/UL — SIGNIFICANT CHANGE UP (ref 3.8–10.5)
WBC # FLD AUTO: 8.4 K/UL — SIGNIFICANT CHANGE UP (ref 3.8–10.5)
WBC UR QL: SIGNIFICANT CHANGE UP (ref 0–?)

## 2019-08-30 PROCEDURE — 74177 CT ABD & PELVIS W/CONTRAST: CPT | Mod: 26

## 2019-08-30 PROCEDURE — 99284 EMERGENCY DEPT VISIT MOD MDM: CPT

## 2019-08-30 RX ORDER — LIDOCAINE 4 G/100G
1 CREAM TOPICAL
Qty: 5 | Refills: 0
Start: 2019-08-30 | End: 2019-09-03

## 2019-08-30 RX ORDER — OXYCODONE HYDROCHLORIDE 5 MG/1
5 TABLET ORAL ONCE
Refills: 0 | Status: DISCONTINUED | OUTPATIENT
Start: 2019-08-30 | End: 2019-08-30

## 2019-08-30 RX ORDER — LIDOCAINE 4 G/100G
1 CREAM TOPICAL ONCE
Refills: 0 | Status: COMPLETED | OUTPATIENT
Start: 2019-08-30 | End: 2019-08-30

## 2019-08-30 RX ORDER — CYCLOBENZAPRINE HYDROCHLORIDE 10 MG/1
1 TABLET, FILM COATED ORAL
Qty: 6 | Refills: 0
Start: 2019-08-30 | End: 2019-08-31

## 2019-08-30 RX ORDER — ACETAMINOPHEN 500 MG
975 TABLET ORAL ONCE
Refills: 0 | Status: COMPLETED | OUTPATIENT
Start: 2019-08-30 | End: 2019-08-30

## 2019-08-30 RX ORDER — OXYCODONE HYDROCHLORIDE 5 MG/1
1 TABLET ORAL
Qty: 4 | Refills: 0
Start: 2019-08-30 | End: 2019-08-31

## 2019-08-30 RX ORDER — CAPSAICIN 0.025 %
1 CREAM (GRAM) TOPICAL
Qty: 10 | Refills: 0
Start: 2019-08-30 | End: 2019-09-28

## 2019-08-30 RX ADMIN — Medication 975 MILLIGRAM(S): at 03:11

## 2019-08-30 RX ADMIN — Medication 975 MILLIGRAM(S): at 05:50

## 2019-08-30 RX ADMIN — OXYCODONE HYDROCHLORIDE 5 MILLIGRAM(S): 5 TABLET ORAL at 06:00

## 2019-08-30 RX ADMIN — LIDOCAINE 1 PATCH: 4 CREAM TOPICAL at 03:11

## 2019-08-30 NOTE — ED ADULT NURSE NOTE - OBJECTIVE STATEMENT
pt a&Ox3, c/o L sided back pain & increased urinary frequency since yesterday, pt breathing even & unlabored, denies n/v/d, dizziness, headache, sob, cp, numbness, tingling. 20G IVplaced in L AC, labs sent, medicated as per  MD order, family at bedside, awaiting CT, will continue to monitor.

## 2019-08-30 NOTE — ED PROVIDER NOTE - NSFOLLOWUPINSTRUCTIONS_ED_ALL_ED_FT
You were seen today for flank and back pain.     You did not have any abnormalities on the CT scan except for evidence of possible gastritis or peptic ulcer disease- you should follow up with your primary care doctor or gastroenterologist regarding these findings.     Take tylenol 650 mg every 4-6 hours as needed for pain, max daily dose 4000 mg/day.   take oxycodone 5 mg every 8 hours as needed for pain not controlled with tylenol. Do not drive while taking this medication, it may make you dizzy and nauseas.     Follow up with your primary doctor in 1-2 days  Follow up with your primary care doctor or orthopedist for physical therapy     Return to the emergency department for blood in urine, worsening loss of bladder control/loss of bowel control, fever, vomiting, inability to walk, weakness/numbness, or if you have any new or changing symptoms or concerns. You were seen today for flank and back pain.     You did not have any abnormalities on the CT scan except for evidence of possible gastritis or peptic ulcer disease- you should follow up with your primary care doctor or gastroenterologist regarding these findings.     Take tylenol 650 mg every 4-6 hours as needed for pain, max daily dose 4000 mg/day.   take oxycodone 5 mg every 8 hours as needed for pain not controlled with tylenol. Do not drive while taking this medication, it may make you dizzy and nauseas.     You can also trial flexeril 10mg every 8 hours as a muscle relaxant.     Follow up with your primary doctor in 1-2 days  Follow up with your primary care doctor or orthopedist for physical therapy     Return to the emergency department for blood in urine, worsening loss of bladder control/loss of bowel control, fever, vomiting, inability to walk, weakness/numbness, or if you have any new or changing symptoms or concerns.

## 2019-08-30 NOTE — ED PROVIDER NOTE - PROGRESS NOTE DETAILS
Evelio PGY3: Patient more comfortable but would like additional pain medication, will give one dose of oxycodone and encourage tylenol/motrin use at home as well as pmd f/u Evelio PGY3: Patient more comfortable but would like additional pain medication, will give one dose of oxycodone and encourage tylenol use at home as well as pmd f/u. Pt states she cannot take NSAIDs due to allergy

## 2019-08-30 NOTE — ED PROVIDER NOTE - PHYSICAL EXAMINATION
Gen: AAOx3, NAD   Head: NCAT  ENT: Airway patent, moist mucous membranes  Cardiac: Normal rate, normal rhythm, no murmurs/rubs/gallops appreciated  Respiratory: Lungs CTA B/L  Gastrointestinal: Abdomen soft, nontender, nondistended, no rebound, no guarding  : +Left CVAT   MSK: No gross abnormalities, FROM of all four extremities, no edema  HEME: Extremities warm, pulses intact and symmetrical in all four extremities  Skin: No rashes, no lesions  Neuro: No gross neurologic deficits, strength equal in all four extremities, no gait abnormality, no sensory deficit s

## 2019-08-30 NOTE — ED PROVIDER NOTE - ATTENDING CONTRIBUTION TO CARE
59F hx DM, HTN, CVA, CKD  presenting c/o  L sided back pain and cloudy urine since yesterday. 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 patient took cipro and then came to ED. Denies any fever. (+) chills and left sided back pain radiating to the front lower left abdominal area. no hematuria, urinary frequency, trauma.   ***GEN - NAD; well appearing; A+O x3 ***HEAD - NC/AT ***EYES/NOSE - PEERL, EOMI, mucous membranes moist, no discharge ***THROAT: Oral cavity and pharynx normal. No inflammation, swelling, exudate, or lesions.  ***NECK: Neck supple, non-tender without lymphadenopathy, no masses, no thyromegaly.   ***PULMONARY - CTA b/l, symmetric breath sounds. ***CARDIAC -s1s2, RRR, no M,G,R  ***ABDOMEN - +BS, ND, ttp over left flank and llq, soft, no guarding, no rebound, no masses   ***BACK - (+) left CVA tenderness, Normal  spine ***EXTREMITIES - symmetric pulses, 2+ dp, capillary refill < 2 seconds, no clubbing, no cyanosis, no edema ***SKIN - no rash or bruising   ***NEUROLOGIC - alert, CN 2-12 intact, reflexes nl, sensation nl, coordination nl, finger to nose nl, romberg negative, motor 5/5 RUE/LUE/RLE/LLE/ gait nl, ***PSYCH - insight and judgment nl, memory nl, affect nl, thought nl  MDM: 59F with left flank and back pain, r/o pyelonephritis, stone. check labs, pain control, urine, ct.

## 2019-08-30 NOTE — ED PROVIDER NOTE - OBJECTIVE STATEMENT
59F hx DM, HTN, CVA, CKD  presenting c/o  L sided back pain that started yesterday and has increasingly gotten worse since. Patient was seen by her PMD 8/8/2019 with moderate Leukocytes and cloudy urine and sent home without a prescription. Reports bladder pain, L flank pain and urinary frequency. Took one dose of Cipro that she had at home today at approx 4pm without relief.

## 2019-08-30 NOTE — ED ADULT TRIAGE NOTE - CHIEF COMPLAINT QUOTE
Ambulatory from home complaining of L sided back pain that started yesterday and has increasingly gotten worse since. Patient was seen by her PMH 8/8/2019 with moderate Leukocytes and cloudy urine and sent home without a prescription. Reports bladder pain, L flank pain and urinary frequency. Took one dose of Cirpo that she had at home today at approx 4pm without relief. Patient has PMH stroke 2017 without deficits, HTN, elevated kidney function. Hypertensive in triage, afebrile.

## 2019-08-30 NOTE — ED PROVIDER NOTE - NS ED ROS FT
Gen: No fever, normal appetite  Eyes: No eye irritation or discharge  ENT: No ear pain, congestion, sore throat  Resp: No cough or trouble breathing  Cardiovascular: No chest pain or palpitation  Gastroenteric: No nausea/vomiting, diarrhea, constipation  :  No change in urine output; + dysuria  MS: + left flank pain   Skin: No rashes  Neuro: No headache; no abnormal movements  Remainder negative, except as per the HPI

## 2019-08-30 NOTE — ED PROVIDER NOTE - PATIENT PORTAL LINK FT
You can access the FollowMyHealth Patient Portal offered by [facility] by registering at the following website: [email or web address]. By joining [employer]’s FollowMyHealth portal, you will also be able to view your health information using other applications (apps) compatible with our system.

## 2019-08-30 NOTE — ED PROVIDER NOTE - CLINICAL SUMMARY MEDICAL DECISION MAKING FREE TEXT BOX
59F p/w urinary complaints, left flank pain, no neuro deficits, ambulatory, will pain control, assess for stone or urinary infection or other intraabd pathology given radiation of pain to LLQ

## 2019-08-30 NOTE — ED ADULT NURSE NOTE - NSHOSCREENINGQ1_ED_ALL_ED
cc:

RAKESH LARA MD

****

 

 

DATE OF CONSULTATION

1/1/2018

 

CHIEF COMPLAINT

1. Leukocytosis

2. Erythrocytosis

3. Bandemia

 

HISTORY OF PRESENT ILLNESS

Ms. Leonard is a 76-year-old lady who initially presented to the hospital in

December 2017 with severe peripheral arterial disease requiring right

femoropopliteal bypass and femoral endarterectomy.  Postoperative course was

complicated by urinary tract infection and pneumonia.  She initially presented

with generalized weakness.

 

REVIEW OF SYSTEMS

Limited as the patient is not forthcoming with information.  The majority of

the history and physical is obtained from chart review.

 

PAST MEDICAL HISTORY

1.   Hyperlipidemia

2.   Hypertension

 

PAST SURGICAL HISTORY

1. Tubal ligation in 1982

2. Tonsillectomy as a child

 

ALLERGIES

No known drug allergies.

 

FAMILY HISTORY

Unable to obtain.

 

SOCIAL HISTORY

Per chart review, longstanding smoking history.  Intermittent alcohol.  No

current drug use.



PHYSICAL EXAM

GENERAL: Frail lady in no distress laying in bed

CV:  RRR with no murmurs

Lungs: CTA bilaterally

Abdomen: soft, nontender, nondistended, bowel sounds present

Ext: no edema

Neuro: grossly nonfocal

 

LABORATORY STUDIES

Currently with white blood cell count of 27.6, a hemoglobin of 15.2, a platelet

count of 1,291,000 last differential was done on December 29 and it showed

bandemia and a neutrophilia, early cells vitreous promyelocytes were also

present on the differential.

 

Chemistry studies with a normal creatinine.  Previous LFTs within normal

limits.

 

On trending her white blood cell count, previous values that we have were from

2012 which were normal.  Trending hemoglobin, previous values from 2012 showed

a hemoglobin of 15.6 which was elevated and trending values of platelet count

show again at elevated back in 2012 and elevated during this hospitalization.

I do not have any other platelet counts to document how her counts have trended

when she is not inpatient or presenting to the emergency room with an acute

problem.  Back in 2012, she was admitted for a stroke.

 

ASSESSMENT/PLAN

Leukocytosis and thrombocytosis, certainly some degree of reactive elevation is

present given recent major surgical procedures including endarterectomy and

bypass surgery as well as urinary tract infection and pneumonia.  We will check

iron profile, vitamin B1 and folate.  Early cells are present on the

differential which can be present in a reactive process, but also can be

present if there is an underlying myeloproliferative neoplasm.  As this had

been elevated in the past, this is certainly suspicious and at this point in

time, I do not have CBC trends as an outpatient.  We will follow up on the

above vitamin studies and if unrevealing, we will consider ordering an

ultrasound of the spleen and liver as well as JAK2 and BCR/ABL.

 

 

 

 

 

 

                              _________________________________

                              MD BRYON Saunders/REJI

D:  1/1/2018/12:56 PM

T:  1/1/2018/1:10 PM

Visit #:  [id number]

Job #:  [id number]

MTDMORENO
No

## 2019-08-31 LAB
BACTERIA UR CULT: SIGNIFICANT CHANGE UP
SPECIMEN SOURCE: SIGNIFICANT CHANGE UP

## 2021-02-12 ENCOUNTER — APPOINTMENT (OUTPATIENT)
Dept: BREAST CENTER | Facility: CLINIC | Age: 61
End: 2021-02-12
Payer: COMMERCIAL

## 2021-02-12 VITALS — DIASTOLIC BLOOD PRESSURE: 87 MMHG | HEART RATE: 73 BPM | SYSTOLIC BLOOD PRESSURE: 148 MMHG

## 2021-02-12 VITALS — BODY MASS INDEX: 31.15 KG/M2 | HEIGHT: 61 IN | WEIGHT: 165 LBS

## 2021-02-12 DIAGNOSIS — G45.9 TRANSIENT CEREBRAL ISCHEMIC ATTACK, UNSPECIFIED: ICD-10-CM

## 2021-02-12 DIAGNOSIS — N60.02 SOLITARY CYST OF LEFT BREAST: ICD-10-CM

## 2021-02-12 DIAGNOSIS — N64.9 DISORDER OF BREAST, UNSPECIFIED: ICD-10-CM

## 2021-02-12 PROCEDURE — 99213 OFFICE O/P EST LOW 20 MIN: CPT

## 2021-02-12 PROCEDURE — 99072 ADDL SUPL MATRL&STAF TM PHE: CPT

## 2021-02-12 RX ORDER — METFORMIN HYDROCHLORIDE 1000 MG/1
1000 TABLET, COATED ORAL
Refills: 0 | Status: DISCONTINUED | COMMUNITY
End: 2021-02-12

## 2021-02-12 RX ORDER — GLIPIZIDE 2.5 MG/1
2.5 TABLET, EXTENDED RELEASE ORAL
Refills: 0 | Status: ACTIVE | COMMUNITY

## 2021-03-11 ENCOUNTER — EMERGENCY (EMERGENCY)
Facility: HOSPITAL | Age: 61
LOS: 1 days | Discharge: HOME | End: 2021-03-11
Attending: STUDENT IN AN ORGANIZED HEALTH CARE EDUCATION/TRAINING PROGRAM | Admitting: STUDENT IN AN ORGANIZED HEALTH CARE EDUCATION/TRAINING PROGRAM
Payer: COMMERCIAL

## 2021-03-11 VITALS
HEART RATE: 56 BPM | RESPIRATION RATE: 18 BRPM | SYSTOLIC BLOOD PRESSURE: 172 MMHG | DIASTOLIC BLOOD PRESSURE: 60 MMHG | TEMPERATURE: 98 F | OXYGEN SATURATION: 97 %

## 2021-03-11 VITALS
TEMPERATURE: 98 F | RESPIRATION RATE: 18 BRPM | DIASTOLIC BLOOD PRESSURE: 76 MMHG | OXYGEN SATURATION: 100 % | HEART RATE: 64 BPM | SYSTOLIC BLOOD PRESSURE: 151 MMHG

## 2021-03-11 LAB
ALBUMIN SERPL ELPH-MCNC: 4.1 G/DL — SIGNIFICANT CHANGE UP (ref 3.3–5)
ALP SERPL-CCNC: 146 U/L — HIGH (ref 40–120)
ALT FLD-CCNC: 13 U/L — SIGNIFICANT CHANGE UP (ref 4–33)
ANION GAP SERPL CALC-SCNC: 9 MMOL/L — SIGNIFICANT CHANGE UP (ref 7–14)
APTT BLD: 35.3 SEC — SIGNIFICANT CHANGE UP (ref 27–36.3)
AST SERPL-CCNC: 13 U/L — SIGNIFICANT CHANGE UP (ref 4–32)
BASE EXCESS BLDV CALC-SCNC: 0.8 MMOL/L — SIGNIFICANT CHANGE UP (ref -3–2)
BASOPHILS # BLD AUTO: 0.04 K/UL — SIGNIFICANT CHANGE UP (ref 0–0.2)
BASOPHILS NFR BLD AUTO: 0.6 % — SIGNIFICANT CHANGE UP (ref 0–2)
BILIRUB SERPL-MCNC: 0.5 MG/DL — SIGNIFICANT CHANGE UP (ref 0.2–1.2)
BLOOD GAS VENOUS - CREATININE: 0.9 MG/DL — SIGNIFICANT CHANGE UP (ref 0.5–1.3)
BLOOD GAS VENOUS COMPREHENSIVE RESULT: SIGNIFICANT CHANGE UP
BUN SERPL-MCNC: 24 MG/DL — HIGH (ref 7–23)
CALCIUM SERPL-MCNC: 9.7 MG/DL — SIGNIFICANT CHANGE UP (ref 8.4–10.5)
CHLORIDE BLDV-SCNC: 108 MMOL/L — SIGNIFICANT CHANGE UP (ref 96–108)
CHLORIDE SERPL-SCNC: 103 MMOL/L — SIGNIFICANT CHANGE UP (ref 98–107)
CO2 SERPL-SCNC: 25 MMOL/L — SIGNIFICANT CHANGE UP (ref 22–31)
CREAT SERPL-MCNC: 0.9 MG/DL — SIGNIFICANT CHANGE UP (ref 0.5–1.3)
EOSINOPHIL # BLD AUTO: 0.43 K/UL — SIGNIFICANT CHANGE UP (ref 0–0.5)
EOSINOPHIL NFR BLD AUTO: 6.1 % — HIGH (ref 0–6)
ERYTHROCYTE [SEDIMENTATION RATE] IN BLOOD: 36 MM/HR — HIGH (ref 4–25)
GAS PNL BLDV: 136 MMOL/L — SIGNIFICANT CHANGE UP (ref 136–146)
GLUCOSE BLDV-MCNC: 165 MG/DL — HIGH (ref 70–99)
GLUCOSE SERPL-MCNC: 145 MG/DL — HIGH (ref 70–99)
HCO3 BLDV-SCNC: 24 MMOL/L — SIGNIFICANT CHANGE UP (ref 20–27)
HCT VFR BLD CALC: 38.8 % — SIGNIFICANT CHANGE UP (ref 34.5–45)
HCT VFR BLDA CALC: 36.7 % — SIGNIFICANT CHANGE UP (ref 34.5–46.5)
HGB BLD CALC-MCNC: 11.9 G/DL — SIGNIFICANT CHANGE UP (ref 11.5–15.5)
HGB BLD-MCNC: 12.1 G/DL — SIGNIFICANT CHANGE UP (ref 11.5–15.5)
IANC: 5.02 K/UL — SIGNIFICANT CHANGE UP (ref 1.5–8.5)
IMM GRANULOCYTES NFR BLD AUTO: 0.4 % — SIGNIFICANT CHANGE UP (ref 0–1.5)
INR BLD: 1.03 RATIO — SIGNIFICANT CHANGE UP (ref 0.88–1.16)
LACTATE BLDV-MCNC: 1.2 MMOL/L — SIGNIFICANT CHANGE UP (ref 0.5–2)
LYMPHOCYTES # BLD AUTO: 0.98 K/UL — LOW (ref 1–3.3)
LYMPHOCYTES # BLD AUTO: 13.9 % — SIGNIFICANT CHANGE UP (ref 13–44)
MCHC RBC-ENTMCNC: 26.8 PG — LOW (ref 27–34)
MCHC RBC-ENTMCNC: 31.2 GM/DL — LOW (ref 32–36)
MCV RBC AUTO: 85.8 FL — SIGNIFICANT CHANGE UP (ref 80–100)
MONOCYTES # BLD AUTO: 0.54 K/UL — SIGNIFICANT CHANGE UP (ref 0–0.9)
MONOCYTES NFR BLD AUTO: 7.7 % — SIGNIFICANT CHANGE UP (ref 2–14)
NEUTROPHILS # BLD AUTO: 5.02 K/UL — SIGNIFICANT CHANGE UP (ref 1.8–7.4)
NEUTROPHILS NFR BLD AUTO: 71.3 % — SIGNIFICANT CHANGE UP (ref 43–77)
NRBC # BLD: 0 /100 WBCS — SIGNIFICANT CHANGE UP
NRBC # FLD: 0 K/UL — SIGNIFICANT CHANGE UP
PCO2 BLDV: 46 MMHG — SIGNIFICANT CHANGE UP (ref 41–51)
PH BLDV: 7.36 — SIGNIFICANT CHANGE UP (ref 7.32–7.43)
PLATELET # BLD AUTO: 340 K/UL — SIGNIFICANT CHANGE UP (ref 150–400)
PO2 BLDV: 49 MMHG — HIGH (ref 35–40)
POTASSIUM BLDV-SCNC: 3.8 MMOL/L — SIGNIFICANT CHANGE UP (ref 3.4–4.5)
POTASSIUM SERPL-MCNC: 4 MMOL/L — SIGNIFICANT CHANGE UP (ref 3.5–5.3)
POTASSIUM SERPL-SCNC: 4 MMOL/L — SIGNIFICANT CHANGE UP (ref 3.5–5.3)
PROT SERPL-MCNC: 7.5 G/DL — SIGNIFICANT CHANGE UP (ref 6–8.3)
PROTHROM AB SERPL-ACNC: 11.8 SEC — SIGNIFICANT CHANGE UP (ref 10.6–13.6)
RBC # BLD: 4.52 M/UL — SIGNIFICANT CHANGE UP (ref 3.8–5.2)
RBC # FLD: 12.7 % — SIGNIFICANT CHANGE UP (ref 10.3–14.5)
SAO2 % BLDV: 82.7 % — SIGNIFICANT CHANGE UP (ref 60–85)
SARS-COV-2 RNA SPEC QL NAA+PROBE: SIGNIFICANT CHANGE UP
SODIUM SERPL-SCNC: 137 MMOL/L — SIGNIFICANT CHANGE UP (ref 135–145)
TROPONIN T, HIGH SENSITIVITY RESULT: <6 NG/L — SIGNIFICANT CHANGE UP
WBC # BLD: 7.04 K/UL — SIGNIFICANT CHANGE UP (ref 3.8–10.5)
WBC # FLD AUTO: 7.04 K/UL — SIGNIFICANT CHANGE UP (ref 3.8–10.5)

## 2021-03-11 PROCEDURE — 99291 CRITICAL CARE FIRST HOUR: CPT

## 2021-03-11 PROCEDURE — 70498 CT ANGIOGRAPHY NECK: CPT | Mod: 26

## 2021-03-11 PROCEDURE — 70450 CT HEAD/BRAIN W/O DYE: CPT | Mod: 26,59

## 2021-03-11 PROCEDURE — 70496 CT ANGIOGRAPHY HEAD: CPT | Mod: 26

## 2021-03-11 PROCEDURE — 71045 X-RAY EXAM CHEST 1 VIEW: CPT | Mod: 26

## 2021-03-11 RX ORDER — METOCLOPRAMIDE HCL 10 MG
10 TABLET ORAL ONCE
Refills: 0 | Status: COMPLETED | OUTPATIENT
Start: 2021-03-11 | End: 2021-03-11

## 2021-03-11 RX ORDER — SODIUM CHLORIDE 9 MG/ML
1000 INJECTION INTRAMUSCULAR; INTRAVENOUS; SUBCUTANEOUS ONCE
Refills: 0 | Status: COMPLETED | OUTPATIENT
Start: 2021-03-11 | End: 2021-03-11

## 2021-03-11 RX ORDER — KETOROLAC TROMETHAMINE 30 MG/ML
15 SYRINGE (ML) INJECTION ONCE
Refills: 0 | Status: DISCONTINUED | OUTPATIENT
Start: 2021-03-11 | End: 2021-03-11

## 2021-03-11 RX ORDER — ACETAMINOPHEN 500 MG
975 TABLET ORAL ONCE
Refills: 0 | Status: COMPLETED | OUTPATIENT
Start: 2021-03-11 | End: 2021-03-11

## 2021-03-11 RX ADMIN — Medication 975 MILLIGRAM(S): at 09:53

## 2021-03-11 RX ADMIN — Medication 15 MILLIGRAM(S): at 10:12

## 2021-03-11 RX ADMIN — Medication 10 MILLIGRAM(S): at 09:53

## 2021-03-11 RX ADMIN — SODIUM CHLORIDE 1000 MILLILITER(S): 9 INJECTION INTRAMUSCULAR; INTRAVENOUS; SUBCUTANEOUS at 09:53

## 2021-03-11 RX ADMIN — Medication 975 MILLIGRAM(S): at 09:56

## 2021-03-11 NOTE — ED PROVIDER NOTE - PROGRESS NOTE DETAILS
anyi novak pgy2: pt now reporting that she has also been having an intermittent R sided throbbing headache, behind her eye, no worsening anyi novak pgy2: pt now reporting that she has also been having an intermittent R sided throbbing headache, behind her eye, no photophobia, phonophobia. used eye drops but no OTC medications. has not taken his medications this morning, hypertensive in the ED. will treat pain and reassess. Damien Avitia DO: reassess after nurse concern for arm drift. no drift on my exam. no facial droop. anyi novak pgy2: pt feeling improved after medications, headache, dizziness and facial numbness has resolved. small amount of numbness in the left hand remaining. continues to have no pronator drift or facial droop on exam. past dysphagia screening, ambulatory in the ED. pt wanting to go home. discussed with neurology that states since pt symptoms have resolved outpatient neurology follow up this week for outpatient MRI is an acceptable route of management. will instruct pt to continue with plavix and statin.

## 2021-03-11 NOTE — ED PROVIDER NOTE - OBJECTIVE STATEMENT
59yo F Hx of HTN, HLD, DM, CVA presenting with complaints if lightheadedness and feeling off balance. 61yo F Hx of HTN, HLD, DM, CVA presenting with complaints if lightheadedness and feeling off balance. has been having intermittent dizziness described as feeling off balance, as though she is going to fall with left sided facial and tongue numbness as well as L arm numbness since Sunday (4 days ago). symptoms started today at 3AM and were more pronounced than previously. has had similar symptoms in the past but not to this severity. feels like she is having some trouble forming her words but no slurred speech, no facial droop or weakness. denies any chest pain or sob. is on plavix and simvastatin which she has been complaint with. able to ambulate.

## 2021-03-11 NOTE — ED ADULT NURSE NOTE - OBJECTIVE STATEMENT
Rene RN: Pt arrives to ER as code stroke. Pt reports feeling dizzy this morning with numbness noted to rt arm. Pt endorsing slurred speech. Pt a&ox4, ambulatory with assistance, skin intact, respirations even and unlabored. 18G placed by CAROLINE Medrano in left arm, labs drawn and sent. Pt reports being on Plavix. Pt has equal arm strength bilaterally, facial droop not noted. Pt brought to room 10. Will endorse report to primary RNAmbrosio.

## 2021-03-11 NOTE — ED PROVIDER NOTE - NEUROLOGICAL, MLM
Alert and oriented, no focal deficits, strength and sensation intact x4, cranial nerves grossly intact, smooth finger to nose, no pronator drift. slight stutter but no slurred speech.

## 2021-03-11 NOTE — ED PROVIDER NOTE - PATIENT PORTAL LINK FT
You can access the FollowMyHealth Patient Portal offered by [facility] by registering at the following website: [email or web address]. By joining [employer]’s FollowMyHealth portal, you will also be able to view your health information using other applications (apps) compatible with our system.

## 2021-03-11 NOTE — ED PROVIDER NOTE - NSFOLLOWUPINSTRUCTIONS_ED_ALL_ED_FT
you were seen in the emergency department today for headache, dizziness and numbness.   you had imaging and lab work performed without any emergent findings and were evaluated by neurology.   you were given medication to help with your symptoms.   you can take tylenol at home for headaches.   continue taking your prescribed medications.   follow up with  Jessica Taylor, Stroke NP, within 1 week    [facility] @ [address], [address] [place]; [phone]    SEEK IMMEDIATE MEDICAL CARE IF YOU HAVE ANY OF THE FOLLOWING SYMPTOMS: vomiting, changes in your vision or speech, weakness in your arms or legs, trouble speaking or swallowing, chest pain, abdominal pain, shortness of breath, sweating, bleeding, headache, neck pain, or fever.

## 2021-03-11 NOTE — CONSULT NOTE ADULT - NSHPATTENDINGPLANDISCUSS_GEN_ALL_CORE
neurology resident on telephone and I agree with above assessment and plan and outpatient MRI and neurology follow up.

## 2021-03-11 NOTE — ED PROVIDER NOTE - CROS ED NEURO POS
dizziness/lightheadedness. numbness/DIFFICULTY WALKING / IMBALANCE dizziness/lightheadedness. numbness/HEADACHE/DIFFICULTY WALKING / IMBALANCE

## 2021-03-11 NOTE — ED PROVIDER NOTE - CARE PROVIDER_API CALL
Jessica Taylor (NP; RN)  NP in Family Health  [address], [address]  [place] [address]  Phone: [phone]  Fax: [phone]  Follow Up Time:

## 2021-03-11 NOTE — CONSULT NOTE ADULT - SUBJECTIVE AND OBJECTIVE BOX
Neurology  Consult Note  03-11-21    Name:  ANGELA REED; 60y (1960)    Reason for Admission:     Chief Complaint:  Code Stroke     HPI:  61yo AA woman with a PMHx of  hld, htn, dm, prior ischemic infarction 2017 w/o residual deficit on plavix (ASA c/b GIB), presents with 4 days of right sided HA, throbbing, intermittent, associated with lightheadedness, and decreased sensation over left face and arm. She also reports associated nausea w/o vomiting, with mild gait imbalance. In addition, patient reporting mild stutter, without language impairment. Patient noting mild symptoms. Code stroke cancelled 2/2 downgrade in agreement with the ED.   Currently denies fevers, chills, ns, cp, sob, abd pain, d/c, weakness or changes to weight or other senses.     Review of Systems:  As states in HPI.    Allergies:  aspirin (Pruritus)    PMHx:   Diabetes    Hypertension      PFHx:     PSuHx:   No significant past surgical history    Medications:  MEDICATIONS  (STANDING):    MEDICATIONS  (PRN):    Vitals:  T(C): 36.4 (03-11-21 @ 08:02), Max: 36.4 (03-11-21 @ 08:02)  HR: 58 (03-11-21 @ 12:06) (58 - 65)  BP: 147/75 (03-11-21 @ 12:06) (147/75 - 204/104)  RR: 18 (03-11-21 @ 12:06) (18 - 18)  SpO2: 100% (03-11-21 @ 12:06) (100% - 100%)    Labs:                        12.1   7.04  )-----------( 340      ( 11 Mar 2021 08:29 )             38.8     03-11    137  |  103  |  24<H>  ----------------------------<  145<H>  4.0   |  25  |  0.90    Ca    9.7      11 Mar 2021 08:29    TPro  7.5  /  Alb  4.1  /  TBili  0.5  /  DBili  x   /  AST  13  /  ALT  13  /  AlkPhos  146<H>  03-11    CAPILLARY BLOOD GLUCOSE      POCT Blood Glucose.: 148 mg/dL (11 Mar 2021 08:04)    LIVER FUNCTIONS - ( 11 Mar 2021 08:29 )  Alb: 4.1 g/dL / Pro: 7.5 g/dL / ALK PHOS: 146 U/L / ALT: 13 U/L / AST: 13 U/L / GGT: x             PT/INR - ( 11 Mar 2021 08:29 )   PT: 11.8 sec;   INR: 1.03 ratio         PTT - ( 11 Mar 2021 08:29 )  PTT:35.3 sec    PHYSICAL EXAMINATION:  General: Well-developed, well nourished, in no acute distress.  Eyes: Conjunctiva and sclera clear.  Neurologic:  - Mental Status:  Alert, awake, oriented to person, place, and time; Speech is fluent with intact naming, repetition, and comprehension (occasion stutter on certain words, 'today is a bright and uy-fl-kr-kenya day,' able to say most phrases without stutter.); Good overall fund of knowledge;   - Cranial Nerves II-XII:    II:  Visual fields are full to confrontation; Pupils are equal, round, and reactive to light;   III, IV, VI:  Extraocular movements are intact without nystagmus.  V:  Facial sensation is intact in the V1-V3 distribution bilaterally.  VII:  Face is symmetric with normal eye closure and smile  VIII:  Hearing is intact to finger rub.  IX, X:  Uvula is midline and soft palate rises symmetrically  XI:  Head turning and shoulder shrug are intact.  XII:  Tongue protudes in the midline.  - Motor:  Strength is 5/5 throughout.  There is no pronator drift.  Normal muscle bulk and tone throughout.  - Reflexes:  2+ and symmetric at the biceps, triceps, brachioradialis, knees, and ankles.  Plantar responses flexor.  - Sensory:  Intact throughout to LT  - Coordination:  Finger-nose-finger and heel-knee-shin intact without dysmetria.  Rapid alternating hand movements intact.  - Gait:   Normal steps, base, arm swing, and turning.  Tandem gait is normal.      NIHSS 0    Radiology:  < from: CT Angio Neck w/ IV Cont (03.11.21 @ 08:26) >  IMPRESSION: Short segment stenosis seen involving both distal vertebral arteries as well as the superior basilar artery.  Findings discussed with neurology resident on March 11, 2020 at 8:34 AM  with read back.    RUSSELL ELLISON M.D., ATTENDING RADIOLOGIST  This document has been electronically signed. Mar 11 2021  8:36AM  < end of copied text >    < from: CT Brain Stroke Protocol (03.11.21 @ 08:26) >  IMPRESSION: No significant change when allowing for differences.    Findings discussed with neurology resident on March 11, 2021 at 8:23 AM  with read back.    RUSSELL ELLISON M.D., ATTENDING RADIOLOGIST  This document has been electronically signed. Mar 11 2021  8:25AM  < end of copied text >

## 2021-03-11 NOTE — CONSULT NOTE ADULT - ASSESSMENT
Impression:    Patient presenting with intermittent dizziness in the setting of multiple subjective neurologic complaints. Physical examination negative for focal neurologic deficits.   Dizziness likely peripheral or systemic in etiology.   Chronic vert/basilar changes noted.     Plan:  [] telemetry monitoring  [] Continue Plavix  [] Atorvastatin 80 qD; titrate to LDL <70  [] DVT ppx    Imaging   [] Consider CDU for observation for resolution of symptoms.   [] MRI head non contrast (in v. outpatient)   [] STAT repeat CTH if change in neuro status    Labs   [] CBC, BMP  [] Lipid panel  [] HbA1C    Other  [] PT/OT    Follow-up  [] f/u outpatient with Jessica Taylor, Stroke NP, within 1 week of discharge or with her private Neurologist    - Please email patient contact information to [email or web address].    - [facility] [place] @ [address] [place] [address], [address] [place]; [phone]

## 2021-03-11 NOTE — ED PROVIDER NOTE - CLINICAL SUMMARY MEDICAL DECISION MAKING FREE TEXT BOX
61yo F Hx HTN, HLD, DM, CVA, complaint with plavix and simvastatin presenting with intermittent lightheadedness/off balance and left sided facial and LUE numbness x4 days, worse this morning at 3AM. with no focal neurological deficits on exam, smooth finger to nose, no pronator drift, alert and oriented, slight stutter. seen previously for similar symptoms 2 years ago and was seen by neuro in the CDU with MRI finding similar appearing moderate left greater than right narrowing of the distal bilateral intracranial vertebral arteries as well as Similar appearing chronic infarcts involving the right centrum semiovale,   bilateral corona radiata, and anterior body/genu of the corpus callosum, correlation for possible susac disease. will obtain CT/CTA head and neck, labs, cxr and neurology consultation. possible TIA, CVA, peripheral vertigo. consider trial of meclizine. reassess.

## 2021-03-11 NOTE — ED ADULT TRIAGE NOTE - CHIEF COMPLAINT QUOTE
pt st" Since Sunday I been having episodes of dizziness and left facial numbness was coming and going but now at 3am I got up to use bathroom and was very dizzy and constant and left face feels numb and left arm feels weak." Code Stroke called

## 2021-03-11 NOTE — ED PROVIDER NOTE - ATTENDING CONTRIBUTION TO CARE
59 yo F past medical history hld, htn, dm, cva, with several days of right sided ha, lightheadedness, feeling off balance with left facial and tongue numbness. symptoms increased today so she came to ED. reports diff speaking. denies fever chills, vision/hearing changes, cp, sob abd pain, ext numbness or weakness. exam as above. patient initially called stroke alert but downgraded given timing. low suspicion for dissection, temporal arteritis. poss CVA out of tpa window. neuro at bedside. plan: ctb/cta, labs, cxr, ekg. symptom relief, reasses.

## 2021-03-11 NOTE — ED ADULT NURSE NOTE - NSIMPLEMENTINTERV_GEN_ALL_ED
Implemented All Fall Risk Interventions:  [place] to call system. Call bell, personal items and telephone within reach. Instruct patient to call for assistance. Room bathroom lighting operational. Non-slip footwear when patient is off stretcher. Physically safe environment: no spills, clutter or unnecessary equipment. Stretcher in lowest position, wheels locked, appropriate side rails in place. Provide visual cue, wrist band, yellow gown, etc. Monitor gait and stability. Monitor for mental status changes and reorient to person, place, and time. Review medications for side effects contributing to fall risk. Reinforce activity limits and safety measures with patient and family.

## 2021-11-22 ENCOUNTER — APPOINTMENT (OUTPATIENT)
Dept: SURGICAL ONCOLOGY | Facility: CLINIC | Age: 61
End: 2021-11-22
Payer: COMMERCIAL

## 2021-11-22 VITALS
TEMPERATURE: 98.7 F | SYSTOLIC BLOOD PRESSURE: 162 MMHG | DIASTOLIC BLOOD PRESSURE: 86 MMHG | WEIGHT: 160 LBS | HEIGHT: 61 IN | OXYGEN SATURATION: 97 % | HEART RATE: 85 BPM | RESPIRATION RATE: 16 BRPM | BODY MASS INDEX: 30.21 KG/M2

## 2021-11-22 PROCEDURE — 99244 OFF/OP CNSLTJ NEW/EST MOD 40: CPT

## 2021-11-22 NOTE — PHYSICAL EXAM
[Normal] : supple, no neck mass and thyroid not enlarged [Normal Neck Lymph Nodes] : normal neck lymph nodes  [Normal Supraclavicular Lymph Nodes] : normal supraclavicular lymph nodes [Normal Axillary Lymph Nodes] : normal axillary lymph nodes [Normal] : normal appearance, no rash, nodules, vesicles, ulcers, erythema [de-identified] : Groins not examined [de-identified] : Below

## 2021-11-22 NOTE — REASON FOR VISIT
[Initial Consultation] : an initial consultation for [Other: _____] : [unfilled] [FreeTextEntry2] : Left breast pain since ~January 2020

## 2021-11-22 NOTE — ASSESSMENT
[FreeTextEntry1] : Today, she is asymptomatic with a normal breast examination.\par \par \par February 2021:\par Bilateral mammogram and left breast ultrasound at [facility]: BI-RADS 2.\par Discs returned.\par \par Yearly breast imaging should be repeated February 2022, prescription provided.\par \par I have asked to see her after the above imaging called sooner if needed.\par \par Also, since she has tried most conservative measures to help with the mastalgia, I suggested that she contact her insurance provider to see if they have someone who might be able to offer acupuncture for symptom relief.\par \par Reviewed in detail, all questions answered.\par \par Note dictated\par

## 2021-11-22 NOTE — REVIEW OF SYSTEMS
[Negative] : Heme/Lymph [FreeTextEntry5] : Hypertension [FreeTextEntry8] : Heartburn [de-identified] : History of stroke [de-identified] : Diabetes

## 2021-11-22 NOTE — HISTORY OF PRESENT ILLNESS
[de-identified] : 61-year-old lady referred by her internist: Dr. Cindy GUZMAN for the evaluation and management of pain in her LEFT BREAST.\par \par Symptoms have been present since ~2020.\par \par There was no obvious precipitating factor.\par The discomfort is intermittent in nature.\par It occasionally radiates to the left shoulder and left arm.\par No other signs or symptoms related to either breast.\par \par Previously seen for breast examinations by Dr. Shannon Downs.\par \par No prior breast biopsies.\par No previous personal history of breast disease.\par \par Possible mitigating factors:\par She does not have a daily caffeine intake.\par Non-smoker.\par No exogenous hormones.\par \par She has already tried a multivitamin regimen, with no symptom relief.\par She is also taken primrose oil, with no benefit.\par \par No other signs or symptoms related to either breast.\par \par \par Breast imaging:\par 2021:\par Bilateral mammogram and targeted left breast ultrasound at [facility]: BI-RADS 2.\par Discs returned to her today.\par \par \par + FH:\par A maternal cousin had breast cancer.\par No other relatives with carcinoma the breast.\par \par No relatives with ovarian cancer.\par \par Not Ashkenazi\par \par Family history of malignancy:\par Mother: Cancer of the cervix.\par \par \par Reproductive history:\par Menarche at 11.\par  2, para 2, first at 33.\par Natural menopause at 59.\par No HRT.\par \par \par Breast cancer risk score = 15.1\par \par \par \par PMD: Dr. Cindy GUZMAN.\par \par +PLAVIX since she had her stroke in 2017\par \par No pacemaker or defibrillator.\par \par +DIABETES.\par She takes glipizide and Trulicity.\par She does not see an endocrinologist.\par \par \par + Hypertension.\par Treated with atenolol and lisinopril.\par She does not see a cardiologist\par \par + History of CVA in 2017.\par Manifest with bilateral lower extremity weakness.\par Source was never identified.\par No residual deficits.\par Neurology: Dr. Wing DING\par \par + Hypercholesterolemia.\par She takes daily atorvastatin.\par \par She also takes daily famotidine.\par \par \par GYN:\par She has not had a Pap smear since at least .\par \par \par Colonoscopy: Last was in .

## 2022-02-14 ENCOUNTER — NON-APPOINTMENT (OUTPATIENT)
Age: 62
End: 2022-02-14

## 2022-02-16 ENCOUNTER — APPOINTMENT (OUTPATIENT)
Dept: BREAST CENTER | Facility: CLINIC | Age: 62
End: 2022-02-16

## 2022-02-16 ENCOUNTER — NON-APPOINTMENT (OUTPATIENT)
Age: 62
End: 2022-02-16

## 2022-05-23 ENCOUNTER — APPOINTMENT (OUTPATIENT)
Dept: SURGICAL ONCOLOGY | Facility: CLINIC | Age: 62
End: 2022-05-23
Payer: COMMERCIAL

## 2022-06-12 RX ORDER — FLUCONAZOLE 150 MG/1
150 TABLET ORAL
Qty: 2 | Refills: 0 | Status: ACTIVE | COMMUNITY
Start: 2022-04-12

## 2022-06-12 RX ORDER — ESOMEPRAZOLE MAGNESIUM 20 MG/1
20 CAPSULE, DELAYED RELEASE ORAL
Qty: 90 | Refills: 0 | Status: ACTIVE | COMMUNITY
Start: 2022-01-21

## 2022-06-12 RX ORDER — HYDROCHLOROTHIAZIDE 12.5 MG/1
12.5 CAPSULE ORAL
Qty: 90 | Refills: 0 | Status: ACTIVE | COMMUNITY
Start: 2022-01-21

## 2022-06-12 RX ORDER — ALBUTEROL SULFATE 90 UG/1
108 (90 BASE) INHALANT RESPIRATORY (INHALATION)
Qty: 8 | Refills: 0 | Status: ACTIVE | COMMUNITY
Start: 2022-01-21

## 2022-06-12 RX ORDER — OLOPATADINE HCL 1 MG/ML
0.1 SOLUTION/ DROPS OPHTHALMIC
Qty: 5 | Refills: 0 | Status: ACTIVE | COMMUNITY
Start: 2022-04-14

## 2022-06-12 RX ORDER — ATORVASTATIN CALCIUM 40 MG/1
40 TABLET, FILM COATED ORAL
Qty: 90 | Refills: 0 | Status: ACTIVE | COMMUNITY
Start: 2022-01-21

## 2022-06-12 RX ORDER — SITAGLIPTIN 25 MG/1
25 TABLET, FILM COATED ORAL
Qty: 90 | Refills: 0 | Status: ACTIVE | COMMUNITY
Start: 2022-03-25

## 2022-06-12 RX ORDER — DULAGLUTIDE 1.5 MG/.5ML
1.5 INJECTION, SOLUTION SUBCUTANEOUS
Qty: 6 | Refills: 0 | Status: ACTIVE | COMMUNITY
Start: 2022-03-25

## 2022-06-12 RX ORDER — TERCONAZOLE 4 MG/G
0.4 CREAM VAGINAL
Qty: 45 | Refills: 0 | Status: ACTIVE | COMMUNITY
Start: 2022-04-12

## 2022-06-12 NOTE — PHYSICAL EXAM
[Normal] : supple, no neck mass and thyroid not enlarged [Normal Neck Lymph Nodes] : normal neck lymph nodes  [Normal Supraclavicular Lymph Nodes] : normal supraclavicular lymph nodes [Normal Axillary Lymph Nodes] : normal axillary lymph nodes [Normal] : normal appearance, no rash, nodules, vesicles, ulcers, erythema [de-identified] : Groins not examined [de-identified] : Below

## 2022-06-12 NOTE — REVIEW OF SYSTEMS
[Negative] : Heme/Lymph [FreeTextEntry5] : Hypertension [FreeTextEntry8] : Heartburn [de-identified] : History of CVA [de-identified] : Diabetes

## 2022-06-12 NOTE — REASON FOR VISIT
[FreeTextEntry2] : 5/23/2022: She did not keep her appointment for follow-up of left mastalgia, breast cancer risk score = 15.1

## 2022-06-12 NOTE — HISTORY OF PRESENT ILLNESS
[de-identified] : 62 year-old lady.\par \par 2021:\par Referred by her internist: Dr. Cindy GUZMAN for the evaluation and management of pain in her LEFT BREAST.\par \par Symptoms have been present since ~2020.\par \par There was no obvious precipitating factor.\par The discomfort is intermittent in nature.\par It occasionally radiates to the left shoulder and left arm.\par No other signs or symptoms related to either breast.\par \par Previously seen for breast examinations by Dr. Shannon Downs.\par \par No prior breast biopsies.\par No previous personal history of breast disease.\par \par Possible mitigating factors:\par She does not have a daily caffeine intake.\par Non-smoker.\par No exogenous hormones.\par \par She has already tried a multivitamin regimen, with no symptom relief.\par She has also taken primrose oil, with no benefit.\par \par No other signs or symptoms related to either breast.\par \par \par Breast imaging:\par 2021:\par Bilateral mammogram and targeted left breast ultrasound at [facility]: BI-RADS 2.\par Discs returned to her today.\par \par \par + FH:\par A maternal cousin had breast cancer.\par No other relatives with carcinoma the breast.\par \par No relatives with ovarian cancer.\par \par Not Ashkenazi\par \par Family history of malignancy:\par Mother: Cancer of the cervix.\par \par \par Reproductive history:\par Menarche at 11.\par  2, para 2, first at 33.\par Natural menopause at 59.\par No HRT.\par \par \par Breast cancer risk score = 15.1\par \par \par \par PMD: Dr. Cindy GUZMAN.\par \par +PLAVIX since she had her stroke in 2017\par \par No pacemaker or defibrillator.\par \par +DIABETES.\par She takes glipizide and Trulicity.\par She does not see an endocrinologist.\par \par \par + Hypertension.\par Treated with atenolol and lisinopril.\par She does not see a cardiologist\par \par + History of CVA in 2017.\par Manifest with bilateral lower extremity weakness.\par Source was never identified.\par No residual deficits.\par Neurology: Dr. Wing DING\par \par + Hypercholesterolemia.\par She takes daily atorvastatin.\par \par She also takes daily famotidine.\par \par \par GYN:\par She has not had a Pap smear since at least .\par \par \par Colonoscopy: Last was in .

## 2022-06-12 NOTE — ASSESSMENT
[FreeTextEntry1] : 5/23/2022: She did not keep her appointment for follow-up of left mastalgia, breast cancer risk score = 15.1\par \par \par At her index visit, October 2021,  she was asymptomatic with a normal breast examination.\par \par \par February 2021:\par Bilateral mammogram and left breast ultrasound at [facility]: BI-RADS 2.\par Discs returned.\par \par Yearly breast imaging should be repeated February 2022, prescription provided at her index visit........\par \par \par \par Also, since she has tried most conservative measures to help with the mastalgia, I suggested that she contact her insurance provider to see if they have someone who might be able to offer acupuncture for symptom relief.\par \par \par

## 2022-06-13 ENCOUNTER — APPOINTMENT (OUTPATIENT)
Dept: SURGICAL ONCOLOGY | Facility: CLINIC | Age: 62
End: 2022-06-13
Payer: COMMERCIAL

## 2022-06-13 VITALS
HEART RATE: 75 BPM | OXYGEN SATURATION: 98 % | WEIGHT: 160 LBS | BODY MASS INDEX: 30.21 KG/M2 | RESPIRATION RATE: 16 BRPM | DIASTOLIC BLOOD PRESSURE: 85 MMHG | TEMPERATURE: 98.7 F | HEIGHT: 61 IN | SYSTOLIC BLOOD PRESSURE: 141 MMHG

## 2022-06-13 PROCEDURE — 99214 OFFICE O/P EST MOD 30 MIN: CPT

## 2022-06-13 NOTE — HISTORY OF PRESENT ILLNESS
[de-identified] : 62 year-old lady.\par \par 2021:\par Referred by her internist: JEANNA Boyle, for the evaluation and management of pain in her LEFT BREAST.\par \par Symptoms present since ~2020.\par \par There was no obvious precipitating factor.\par The discomfort is intermittent in nature.\par It occasionally radiates to the left shoulder and left arm.\par No other signs or symptoms related to either breast.\par \par Previously seen for breast examinations by Dr. Shannon Downs.\par \par No prior breast biopsies.\par No previous personal history of breast disease.\par \par Possible mitigating factors:\par She does not have a daily caffeine intake.\par Non-smoker.\par No exogenous hormones.\par \par She had tried a multivitamin regimen, with no symptom relief.\par \par She has obtain some relief from primrose oil\par \par Extract also recent with Dr. Treadwell so will so see so he has contact medic recent creatinine to\par No other signs or symptoms related to either breast.\par \par \par Breast imaging:\par 2021:\par Bilateral mammogram and targeted left breast ultrasound at [facility]: BI-RADS 2.\par Discs returned to her.\par \par \par + FH:\par A maternal cousin had breast cancer.\par No other relatives with carcinoma the breast.\par \par No relatives with ovarian cancer.\par \par Not Ashkenazi\par \par Family history of malignancy:\par Mother: Cancer of the cervix.\par \par \par Reproductive history:\par Menarche at 11.\par  2, para 2, first at 33.\par Natural menopause at 59.\par No HRT.\par \par \par Breast cancer risk score = 15.1\par \par \par \par PMD: JEANNA Boyle.\par \par +PLAVIX since she had her stroke in 2017\par \par No pacemaker or defibrillator.\par \par +DIABETES.\par She takes glipizide and Trulicity.\par She does not see an endocrinologist.\par \par \par + Hypertension.\par Treated with atenolol and lisinopril.\par She does not see a cardiologist\par \par + History of CVA in 2017.\par Manifest with bilateral lower extremity weakness.\par Source was never identified.\par No residual deficits.\par Neurology: Dr. Wing DING\par \par + Hypercholesterolemia.\par She takes daily atorvastatin.\par \par She also takes daily famotidine.\par \par \par GYN:\par She has not had a Pap smear since at least .\par \par \par Colonoscopy: Last was in .

## 2022-06-13 NOTE — ASSESSMENT
[FreeTextEntry1] : 5/23/2022: She did not keep her appointment for follow-up of left mastalgia, breast cancer risk score = 15.1\par \par \par At her index visit, October 2021,  she was asymptomatic with a normal breast examination.\par \par \par February 2021:\par Bilateral mammogram and left breast ultrasound at [facility]: BI-RADS 2.\par Discs returned.\par \par Yearly breast imaging was repeated March 2021 at [facility]: Bilateral mammography: BI-RADS 1.\par \par Clinically doing well.\par \par I have asked to see her in another year, sooner if needed\par \par \par \par

## 2022-06-13 NOTE — REASON FOR VISIT
[Follow-Up Visit] : a follow-up visit for [Other: _____] : [unfilled] [FreeTextEntry2] : Left breast pain, breast cancer risk score = 15.1

## 2022-06-13 NOTE — PHYSICAL EXAM
[Normal] : supple, no neck mass and thyroid not enlarged [Normal Neck Lymph Nodes] : normal neck lymph nodes  [Normal Supraclavicular Lymph Nodes] : normal supraclavicular lymph nodes [Normal Axillary Lymph Nodes] : normal axillary lymph nodes [Normal] : normal appearance, no rash, nodules, vesicles, ulcers, erythema [de-identified] : Groins not examined [de-identified] : Below

## 2022-06-13 NOTE — REVIEW OF SYSTEMS
[Negative] : Heme/Lymph [FreeTextEntry5] : Hypertension [FreeTextEntry8] : Heartburn [de-identified] : History of stroke [de-identified] : Breast pain

## 2023-07-08 PROBLEM — N64.4 BREAST PAIN, LEFT: Status: ACTIVE | Noted: 2021-11-20

## 2023-07-10 ENCOUNTER — APPOINTMENT (OUTPATIENT)
Dept: SURGICAL ONCOLOGY | Facility: CLINIC | Age: 63
End: 2023-07-10
Payer: COMMERCIAL

## 2023-07-10 VITALS
HEIGHT: 61 IN | BODY MASS INDEX: 30.21 KG/M2 | SYSTOLIC BLOOD PRESSURE: 155 MMHG | HEART RATE: 80 BPM | WEIGHT: 160 LBS | RESPIRATION RATE: 17 BRPM | DIASTOLIC BLOOD PRESSURE: 82 MMHG | OXYGEN SATURATION: 98 %

## 2023-07-10 DIAGNOSIS — N64.4 MASTODYNIA: ICD-10-CM

## 2023-07-10 PROCEDURE — 99214 OFFICE O/P EST MOD 30 MIN: CPT

## 2023-07-11 NOTE — ASSESSMENT
[FreeTextEntry1] : 63-year-old lady.\par \par Annual breast examination.\par \par Asymptomatic with normal physical examination today.\par \par Breast cancer risk score = 15.1.\par \par \par March 2023:\par Bilateral mammography at [facility]: BI-RADS 1.\par Prescription provided for March 2024\par \par NO accompanying breast ultrasounds.\par \par I have given her prescriptions for breast imaging in March 2024.\par \par Have offered to see her annually, sooner if needed, even though she is relocating to [place] in the fall 2023.\par \par Reviewed in detail, all questions answered.\par

## 2023-07-11 NOTE — REASON FOR VISIT
[Follow-Up Visit] : a follow-up visit for [Other: _____] : [unfilled] [FreeTextEntry2] : Annual breast examination, breast cancer risk score = 15.1

## 2023-07-11 NOTE — HISTORY OF PRESENT ILLNESS
[de-identified] : 63 year-old lady.\par \par \par In the 2023 they will be relocating to [place].\par \par \par 2021:\par Referred by her internist: JEANNA Boyle, for the evaluation and management of pain in her LEFT BREAST.\par \par Symptoms present since ~2020.\par \par There was no obvious precipitating factor.\par The discomfort is intermittent in nature.\par It occasionally radiates to the left shoulder and left arm.\par No other signs or symptoms related to either breast.\par \par Previously seen for breast examinations by Dr. Shannon Downs.\par \par No prior breast biopsies.\par No previous personal history of breast disease.\par \par Possible mitigating factors:\par She does not have a daily caffeine intake.\par Non-smoker.\par No exogenous hormones.\par \par She had tried a multivitamin regimen, with no symptom relief.\par \par She had obtain some relief from primrose oil\par \par No other signs or symptoms related to either breast.\par \par \par 2022:\par Schedule follow-up visit.\par Asymptomatic with normal breast examination.\par \par Today (7/10/2023) she reports intermittent discomfort in both breast, left, more so than the right.\par No provocative or palliative factors.\par No other signs or symptoms.\par The discomfort is poorly localized.\par \par \par Breast imaging:\par 2021:\par Bilateral mammogram and targeted left breast ultrasound at [facility]: BI-RADS 2.\par Discs returned to her.\par \par \par + FH:\par A maternal cousin had breast cancer.\par No other relatives with carcinoma the breast.\par \par No relatives with ovarian cancer.\par \par Not Ashkenazi\par \par Family history of malignancy:\par Mother: Cancer of the cervix.\par \par \par Reproductive history:\par Menarche at 11.\par  2, para 2, first at 33.\par Natural menopause at 59.\par No HRT.\par \par \par Breast cancer risk score = 15.1\par \par \par \par PMD: JEANNA Boyle.\par \par +PLAVIX since she had her stroke in 2017\par \par No pacemaker or defibrillator.\par \par +DIABETES.\par She takes glipizide and Trulicity.\par She does not see an endocrinologist.\par \par \par + Hypertension.\par Treated with atenolol and lisinopril.\par She does not see a cardiologist\par \par + History of CVA in 2017.\par Manifest with bilateral lower extremity weakness.\par Source was never identified.\par No residual deficits.\par Neurology: Dr. Wing DING\par \par + Hypercholesterolemia.\par She takes daily atorvastatin.\par \par She also takes daily famotidine.\par \par \par GYN:\par She has not had a Pap smear since at least .\par \par \par Colonoscopy: Last was in .

## 2023-07-11 NOTE — PHYSICAL EXAM
[Normal] : supple, no neck mass and thyroid not enlarged [Normal Neck Lymph Nodes] : normal neck lymph nodes  [Normal Supraclavicular Lymph Nodes] : normal supraclavicular lymph nodes [Normal Axillary Lymph Nodes] : normal axillary lymph nodes [Normal] : normal appearance, no rash, nodules, vesicles, ulcers, erythema [de-identified] : Groins not examined [de-identified] : Below

## 2023-07-11 NOTE — REVIEW OF SYSTEMS
[Negative] : Heme/Lymph [FreeTextEntry5] : Hypertension [FreeTextEntry8] : Heartburn [de-identified] : History of CVA [de-identified] : Diabetes

## 2024-07-08 ENCOUNTER — APPOINTMENT (OUTPATIENT)
Dept: SURGICAL ONCOLOGY | Facility: CLINIC | Age: 64
End: 2024-07-08

## 2025-01-16 NOTE — ED ADULT TRIAGE NOTE - NS ED NOTE AC HIGH RISK COUNTRIES
Orders:    methocarbamol (ROBAXIN) 500 mg tablet; Take 1 tablet (500 mg total) by mouth 2 (two) times a day as needed for muscle spasms    naproxen (Naprosyn) 500 mg tablet; Take 1 tablet (500 mg total) by mouth 2 (two) times a day with meals    acetaminophen (TYLENOL) 500 mg tablet; Take 1 tablet (500 mg total) by mouth every 6 (six) hours as needed for mild pain     No